# Patient Record
Sex: FEMALE | Race: WHITE | NOT HISPANIC OR LATINO | Employment: UNEMPLOYED | URBAN - METROPOLITAN AREA
[De-identification: names, ages, dates, MRNs, and addresses within clinical notes are randomized per-mention and may not be internally consistent; named-entity substitution may affect disease eponyms.]

---

## 2020-07-29 ENCOUNTER — HOSPITAL ENCOUNTER (EMERGENCY)
Facility: HOSPITAL | Age: 40
Discharge: HOME | End: 2020-07-29
Attending: EMERGENCY MEDICINE
Payer: COMMERCIAL

## 2020-07-29 ENCOUNTER — APPOINTMENT (EMERGENCY)
Dept: RADIOLOGY | Facility: HOSPITAL | Age: 40
End: 2020-07-29
Payer: COMMERCIAL

## 2020-07-29 VITALS
WEIGHT: 161.6 LBS | DIASTOLIC BLOOD PRESSURE: 55 MMHG | HEART RATE: 74 BPM | OXYGEN SATURATION: 95 % | SYSTOLIC BLOOD PRESSURE: 116 MMHG | TEMPERATURE: 98.4 F | RESPIRATION RATE: 18 BRPM

## 2020-07-29 DIAGNOSIS — M94.0 COSTOCHONDRITIS: Primary | ICD-10-CM

## 2020-07-29 PROCEDURE — 99284 EMERGENCY DEPT VISIT MOD MDM: CPT | Performed by: EMERGENCY MEDICINE

## 2020-07-29 PROCEDURE — 93005 ELECTROCARDIOGRAM TRACING: CPT

## 2020-07-29 PROCEDURE — 99285 EMERGENCY DEPT VISIT HI MDM: CPT

## 2020-07-29 PROCEDURE — 71046 X-RAY EXAM CHEST 2 VIEWS: CPT

## 2020-07-29 RX ORDER — IBUPROFEN 400 MG/1
400 TABLET ORAL EVERY 6 HOURS PRN
Qty: 28 TABLET | Refills: 0 | Status: SHIPPED | OUTPATIENT
Start: 2020-07-29 | End: 2022-05-23 | Stop reason: SDUPTHER

## 2020-07-29 RX ORDER — IBUPROFEN 400 MG/1
400 TABLET ORAL ONCE
Status: COMPLETED | OUTPATIENT
Start: 2020-07-29 | End: 2020-07-29

## 2020-07-29 RX ADMIN — IBUPROFEN 400 MG: 400 TABLET ORAL at 16:20

## 2020-07-29 NOTE — ED PROVIDER NOTES
History  Chief Complaint   Patient presents with    Chest Pain     pt states over past 4 days she started w/ central CP w/out radiation  states the pain is constant  describes that pain as shooting   Numbness     pt states she had left arm numbness that stared before her CP began  states the numbness is intermittent  states numbness only occured in left arm  46-year-old female presenting with chest pain and left arm numbness  Her chest pain started acutely 4 days ago, sharp, constant,"8/10," localized to the center of her chest, and sometimes radiates to her back  It is made worse with taking a deep breath, coughing, and putting pressure on the chest  She denies associated diaphoresis but says that she has been experiencing shortness of breath for the past several months  Walking for extended periods of time, or walking upstairs exacerbates the shortness of breath  She has also experienced productive cough that gradually worsened over the past several years  She is a chronic smoker, has been smoking 1 pack per day since the age of 16  Her other complaint is left arm numbness that started 2 days before the chest pain  Patient notes that she has chronic pain in her lower back, neck, and also carpal tunnel syndrome of bilateral wrists  The numbness occurred acutely, with no known immediate associated factors, but believes that 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 may have contributed to her symptoms  The numbness starts randomly, last approximately 30 seconds to a minute, and radiates down from her shoulder to her hands  She also noted that she has similar radiating numbness of her left leg that occurs intermittently but more frequently than the numbness in her left arm  She was also told that she has carpal tunnel syndrome in her left shoulder with nerve impingement  She also takes SSRI for depression  Denies any other medical problems, recent or past surgeries  She denies headaches, palpitations, fevers, changes in vision, hearing, abdominal pain, changes in bowel movements, urinary problems, muscle weakness  None       Past Medical History:   Diagnosis Date    Anxiety     Back pain        Past Surgical History:   Procedure Laterality Date     SECTION      GALLBLADDER SURGERY      HYSTERECTOMY         History reviewed  No pertinent family history  I have reviewed and agree with the history as documented  E-Cigarette/Vaping     E-Cigarette/Vaping Substances     Social History     Tobacco Use    Smoking status: Current Every Day Smoker     Packs/day: 1 00    Smokeless tobacco: Never Used   Substance Use Topics    Alcohol use: Not Currently    Drug use: Not Currently        Review of Systems   Constitutional: Negative for activity change, appetite change, chills, diaphoresis, fatigue, fever and unexpected weight change  HENT: Negative for ear pain, hearing loss and tinnitus  Physical Exam  ED Triage Vitals [20 1508]   Temperature Pulse Respirations Blood Pressure SpO2   98 4 °F (36 9 °C) 84 18 162/71 95 %      Temp Source Heart Rate Source Patient Position - Orthostatic VS BP Location FiO2 (%)   Oral Monitor Sitting Right arm --      Pain Score       8             Orthostatic Vital Signs  Vitals:    20 1508 20 1622   BP: 162/71 116/55   Pulse: 84 74   Patient Position - Orthostatic VS: Sitting Sitting       Physical Exam   Constitutional: She appears well-developed and well-nourished  Non-toxic appearance  She does not appear ill  HENT:   Head: Normocephalic and atraumatic  Eyes: Pupils are equal, round, and reactive to light  EOM are normal    Neck: Normal range of motion  Neck supple  Cardiovascular: Normal rate, regular rhythm, intact distal pulses and normal pulses  No murmur heard  Pulmonary/Chest: Effort normal  No respiratory distress  She has wheezes  Mild generalized expiratory wheezes bilaterally  ED Medications  Medications   ibuprofen (MOTRIN) tablet 400 mg (400 mg Oral Given 7/29/20 1620)       Diagnostic Studies  Results Reviewed     None                 XR chest 2 views   Final Result by Damien Watt MD (07/29 1654)      No acute cardiopulmonary disease  Workstation performed: [id number]               Procedures  Procedures      ED Course                       PERC Rule for PE      Most Recent Value   PERC Rule for PE   Age >=50  0 Filed at: 07/29/2020 1604   HR >=100  0 Filed at: 07/29/2020 1604   O2 Sat on room air < 95%  1 Filed at: 07/29/2020 1604   History of PE or DVT  0 Filed at: 07/29/2020 1604   Recent trauma or surgery  0 Filed at: 07/29/2020 1604   Hemoptysis  0 Filed at: 07/29/2020 1604   Exogenous estrogen  0 Filed at: 07/29/2020 1604   Unilateral leg swelling  0 Filed at: 07/29/2020 1604   PERC Rule for PE Results  1 Filed at: 07/29/2020 1604                Wells' Criteria for PE      Most Recent Value   Wells' Criteria for PE   Clinical signs and symptoms of DVT  0 Filed at: 07/29/2020 1605   PE is primary diagnosis or equally likely  0 Filed at: 07/29/2020 1605   HR >100  0 Filed at: 07/29/2020 1605   Immobilization at least 3 days or Surgery in the previous 4 weeks  0 Filed at: 07/29/2020 1605   Previous, objectively diagnosed PE or DVT  0 Filed at: 07/29/2020 1605   Hemoptysis  0 Filed at: 07/29/2020 1605   Malignancy with treatment within 6 months or palliative  0 Filed at: 07/29/2020 1605   Wells' Criteria Total  0 Filed at: 07/29/2020 1605                MDM  Number of Diagnoses or Management Options  Costochondritis: established and improving  Diagnosis management comments: 73-year-old female presenting with chest pain and left arm numbness  Chest pain is localized to the Center of the chest and is not associated with diaphoresis, acute shortness of breath  Has a history of mild chronic shortness of breath likely due to extensive smoking history    Physical examination reveals acute pain on palpation of the center of the chest, exacerbation of the pain with deep inhalation, or coughing  Chest x-ray revealed no acute abnormalities  EKG revealed no acute abnormalities  Etiology likely costochondritis and the patient will be discharged with 400 mg ibuprofen q 6 hours p r n  Chelo De Luna Patient advised to follow up with the PCP as soon as possible  Advised to come back to the emergency department should her symptoms worsen, including worsening of chest pain, change in the quality chest pain, diaphoresis, acute worsening of mild chronic shortness of breath, syncope, new concerning symptoms  Amount and/or Complexity of Data Reviewed  Tests in the radiology section of CPT®: ordered and reviewed  Discuss the patient with other providers: yes  Independent visualization of images, tracings, or specimens: yes          Disposition  Final diagnoses:   Costochondritis     Time reflects when diagnosis was documented in both MDM as applicable and the Disposition within this note     Time User Action Codes Description Comment    7/29/2020  5:06 PM Carlos Garber Add [M94 0] Costochondritis       ED Disposition     ED Disposition Condition Date/Time Comment    Discharge Stable Wed Jul 29, 2020  5:05 PM Tatiana Wilkins discharge to home/self care  Follow-up Information    None         Patient's Medications   Discharge Prescriptions    IBUPROFEN (MOTRIN) 400 MG TABLET    Take 1 tablet (400 mg total) by mouth every 6 (six) hours as needed for mild pain or moderate pain       Start Date: 7/29/2020 End Date: --       Order Dose: 400 mg       Quantity: 28 tablet    Refills: 0     No discharge procedures on file  PDMP Review     None           ED Provider  Attending physically available and evaluated Tatiana Wilkins I managed the patient along with the ED Attending      Electronically Signed by         Chloe Ghosh MD  07/29/20 9297

## 2020-07-30 LAB
ATRIAL RATE: 74 BPM
P AXIS: 60 DEGREES
PR INTERVAL: 138 MS
QRS AXIS: 72 DEGREES
QRSD INTERVAL: 74 MS
QT INTERVAL: 364 MS
QTC INTERVAL: 404 MS
T WAVE AXIS: 19 DEGREES
VENTRICULAR RATE: 74 BPM

## 2020-07-30 PROCEDURE — 93010 ELECTROCARDIOGRAM REPORT: CPT | Performed by: INTERNAL MEDICINE

## 2021-04-21 ENCOUNTER — OFFICE VISIT (OUTPATIENT)
Dept: FAMILY MEDICINE CLINIC | Facility: CLINIC | Age: 41
End: 2021-04-21
Payer: COMMERCIAL

## 2021-04-21 VITALS
RESPIRATION RATE: 14 BRPM | HEIGHT: 61 IN | BODY MASS INDEX: 33.23 KG/M2 | HEART RATE: 75 BPM | SYSTOLIC BLOOD PRESSURE: 92 MMHG | WEIGHT: 176 LBS | DIASTOLIC BLOOD PRESSURE: 58 MMHG | OXYGEN SATURATION: 97 % | TEMPERATURE: 96.6 F

## 2021-04-21 DIAGNOSIS — Z13.1 SCREENING FOR DIABETES MELLITUS: ICD-10-CM

## 2021-04-21 DIAGNOSIS — M25.461 KNEE EFFUSION, RIGHT: ICD-10-CM

## 2021-04-21 DIAGNOSIS — M25.561 CHRONIC PAIN OF RIGHT KNEE: ICD-10-CM

## 2021-04-21 DIAGNOSIS — G89.29 CHRONIC PAIN OF RIGHT KNEE: ICD-10-CM

## 2021-04-21 DIAGNOSIS — F31.0 BIPOLAR AFFECTIVE DISORDER, CURRENT EPISODE HYPOMANIC (HCC): Primary | ICD-10-CM

## 2021-04-21 DIAGNOSIS — F17.200 TOBACCO USE DISORDER: ICD-10-CM

## 2021-04-21 DIAGNOSIS — E66.09 CLASS 1 OBESITY DUE TO EXCESS CALORIES WITHOUT SERIOUS COMORBIDITY WITH BODY MASS INDEX (BMI) OF 33.0 TO 33.9 IN ADULT: ICD-10-CM

## 2021-04-21 DIAGNOSIS — E78.00 PURE HYPERCHOLESTEROLEMIA: ICD-10-CM

## 2021-04-21 PROBLEM — E78.5 HYPERLIPIDEMIA: Status: ACTIVE | Noted: 2021-04-21

## 2021-04-21 PROCEDURE — 4004F PT TOBACCO SCREEN RCVD TLK: CPT | Performed by: FAMILY MEDICINE

## 2021-04-21 PROCEDURE — 3725F SCREEN DEPRESSION PERFORMED: CPT | Performed by: FAMILY MEDICINE

## 2021-04-21 PROCEDURE — 3008F BODY MASS INDEX DOCD: CPT | Performed by: FAMILY MEDICINE

## 2021-04-21 PROCEDURE — 99203 OFFICE O/P NEW LOW 30 MIN: CPT | Performed by: FAMILY MEDICINE

## 2021-04-21 RX ORDER — ALBUTEROL SULFATE 90 UG/1
AEROSOL, METERED RESPIRATORY (INHALATION)
COMMUNITY
Start: 2021-02-08 | End: 2022-05-16 | Stop reason: SDUPTHER

## 2021-04-21 RX ORDER — QUETIAPINE FUMARATE 50 MG/1
50 TABLET, FILM COATED ORAL
Qty: 90 TABLET | Refills: 2 | Status: SHIPPED | OUTPATIENT
Start: 2021-04-21 | End: 2022-05-16 | Stop reason: SDUPTHER

## 2021-04-21 RX ORDER — QUETIAPINE FUMARATE 25 MG/1
25 TABLET, FILM COATED ORAL
Qty: 90 TABLET | Refills: 2 | Status: SHIPPED | OUTPATIENT
Start: 2021-04-21 | End: 2022-05-16 | Stop reason: SDUPTHER

## 2021-04-21 RX ORDER — GABAPENTIN 600 MG/1
600 TABLET ORAL 3 TIMES DAILY
Qty: 270 TABLET | Refills: 2 | Status: SHIPPED | OUTPATIENT
Start: 2021-04-21 | End: 2022-05-16 | Stop reason: SDUPTHER

## 2021-04-21 RX ORDER — QUETIAPINE FUMARATE 25 MG/1
TABLET, FILM COATED ORAL
COMMUNITY
Start: 2021-03-21 | End: 2021-04-21 | Stop reason: SDUPTHER

## 2021-04-21 RX ORDER — ATORVASTATIN CALCIUM 20 MG/1
20 TABLET, FILM COATED ORAL DAILY
COMMUNITY
End: 2022-05-16 | Stop reason: SDUPTHER

## 2021-04-21 RX ORDER — GABAPENTIN 600 MG/1
600 TABLET ORAL 3 TIMES DAILY
COMMUNITY
End: 2021-04-21 | Stop reason: SDUPTHER

## 2021-04-21 RX ORDER — QUETIAPINE FUMARATE 50 MG/1
50 TABLET, FILM COATED ORAL
COMMUNITY
Start: 2021-03-21 | End: 2021-04-21 | Stop reason: SDUPTHER

## 2021-04-21 NOTE — ASSESSMENT & PLAN NOTE
Patient with pain and swelling in right knee for over year, no acute injury though she has had several traumatic injuries in the last several years  Exam with mild effusion, significant crepitus, pain with knee flexion  She had an x-ray done at urgent care in March that showed a moderate suprapatellar effusion, a 6 mm intra-articular osteochondral body in the posterior medial knee joint  Constellation of symptoms and x-ray findings somewhat concerning for meniscal tear    Will order MRI for further evaluation, consider referral to Ortho pending results of MRI

## 2021-04-21 NOTE — ASSESSMENT & PLAN NOTE
Patient previously followed by psychiatrist and has not seen in many years and was previously being managed by her former primary care provider  She is currently on gabapentin 600 mg 3 times a day which she states is for her mood, as well as Seroquel 75 mg at night  She is very tangential during exam, endorses auditory hallucinations  Denies any significant depressive symptoms at this time  Will continue current regimen though attempt to request records from previous provider
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
## 2021-04-21 NOTE — ASSESSMENT & PLAN NOTE
She is currently on atorvastatin    Last lipid panel on file from 1 year ago in care everywhere was normal   Repeat lipid panel

## 2021-05-04 ENCOUNTER — IMMUNIZATIONS (OUTPATIENT)
Dept: FAMILY MEDICINE CLINIC | Facility: HOSPITAL | Age: 41
End: 2021-05-04

## 2021-05-04 DIAGNOSIS — Z23 ENCOUNTER FOR IMMUNIZATION: Primary | ICD-10-CM

## 2021-05-04 PROCEDURE — 91300 SARS-COV-2 / COVID-19 MRNA VACCINE (PFIZER-BIONTECH) 30 MCG: CPT

## 2021-05-04 PROCEDURE — 0001A SARS-COV-2 / COVID-19 MRNA VACCINE (PFIZER-BIONTECH) 30 MCG: CPT

## 2021-05-17 ENCOUNTER — TELEPHONE (OUTPATIENT)
Dept: FAMILY MEDICINE CLINIC | Facility: CLINIC | Age: 41
End: 2021-05-17

## 2021-05-17 NOTE — TELEPHONE ENCOUNTER
Auth completed for CPT code 16964, approved, Kerri Fuller # [id number] and valid  05/14/2021-07/13/2021  Patient notfiied of message

## 2021-05-18 ENCOUNTER — HOSPITAL ENCOUNTER (OUTPATIENT)
Dept: MRI IMAGING | Facility: HOSPITAL | Age: 41
Discharge: HOME | End: 2021-05-18
Payer: COMMERCIAL

## 2021-05-18 DIAGNOSIS — M25.561 CHRONIC PAIN OF RIGHT KNEE: ICD-10-CM

## 2021-05-18 DIAGNOSIS — G89.29 CHRONIC PAIN OF RIGHT KNEE: ICD-10-CM

## 2021-05-18 DIAGNOSIS — M25.461 KNEE EFFUSION, RIGHT: ICD-10-CM

## 2021-05-18 PROCEDURE — G1004 CDSM NDSC: HCPCS

## 2021-05-18 PROCEDURE — A9585 GADOBUTROL INJECTION: HCPCS | Performed by: FAMILY MEDICINE

## 2021-05-18 PROCEDURE — 73723 MRI JOINT LWR EXTR W/O&W/DYE: CPT

## 2021-05-18 RX ADMIN — GADOBUTROL 7 ML: 604.72 INJECTION INTRAVENOUS at 19:53

## 2021-05-19 ENCOUNTER — TELEPHONE (OUTPATIENT)
Dept: FAMILY MEDICINE CLINIC | Facility: CLINIC | Age: 41
End: 2021-05-19

## 2021-05-19 NOTE — TELEPHONE ENCOUNTER
Please call patient and let her know that her MRI shows wear and tear on the joint between the patella and femur as well as a large effusion (fluid in the knee)  There were no mensicus or ligament tears  She would likely get relief from aspiration of the fluid and a knee injection with a steroid   I can do that here in the office if she would like to schedule an appointment     Physical therapy and a referal to ortho can also be considered as welll

## 2021-05-24 PROBLEM — M22.41 PATELLOFEMORAL CHONDROSIS OF RIGHT KNEE: Status: ACTIVE | Noted: 2021-05-24

## 2021-07-22 DIAGNOSIS — F31.0 BIPOLAR AFFECTIVE DISORDER, CURRENT EPISODE HYPOMANIC (HCC): ICD-10-CM

## 2021-07-22 RX ORDER — QUETIAPINE FUMARATE 50 MG/1
50 TABLET, FILM COATED ORAL
Qty: 90 TABLET | Refills: 2 | Status: CANCELLED | OUTPATIENT
Start: 2021-07-22

## 2021-07-22 RX ORDER — GABAPENTIN 600 MG/1
600 TABLET ORAL 3 TIMES DAILY
Qty: 270 TABLET | Refills: 2 | Status: CANCELLED | OUTPATIENT
Start: 2021-07-22

## 2021-07-22 NOTE — TELEPHONE ENCOUNTER
Patient moved to [facility] & will be looking for another PCP closer to her new home

## 2022-05-16 ENCOUNTER — TELEPHONE (OUTPATIENT)
Dept: FAMILY MEDICINE CLINIC | Facility: CLINIC | Age: 42
End: 2022-05-16

## 2022-05-16 ENCOUNTER — OFFICE VISIT (OUTPATIENT)
Dept: FAMILY MEDICINE CLINIC | Facility: CLINIC | Age: 42
End: 2022-05-16
Payer: COMMERCIAL

## 2022-05-16 VITALS
WEIGHT: 180.6 LBS | HEIGHT: 61 IN | DIASTOLIC BLOOD PRESSURE: 78 MMHG | OXYGEN SATURATION: 96 % | BODY MASS INDEX: 34.1 KG/M2 | HEART RATE: 100 BPM | SYSTOLIC BLOOD PRESSURE: 112 MMHG | TEMPERATURE: 97.4 F

## 2022-05-16 DIAGNOSIS — F31.0 BIPOLAR AFFECTIVE DISORDER, CURRENT EPISODE HYPOMANIC (HCC): ICD-10-CM

## 2022-05-16 DIAGNOSIS — F17.200 TOBACCO USE DISORDER: ICD-10-CM

## 2022-05-16 DIAGNOSIS — G43.009 MIGRAINE WITHOUT AURA AND WITHOUT STATUS MIGRAINOSUS, NOT INTRACTABLE: ICD-10-CM

## 2022-05-16 DIAGNOSIS — F32.A ANXIETY AND DEPRESSION: ICD-10-CM

## 2022-05-16 DIAGNOSIS — R22.1 MASS OF RIGHT SIDE OF NECK: ICD-10-CM

## 2022-05-16 DIAGNOSIS — F41.9 ANXIETY AND DEPRESSION: ICD-10-CM

## 2022-05-16 DIAGNOSIS — E66.09 CLASS 1 OBESITY DUE TO EXCESS CALORIES WITHOUT SERIOUS COMORBIDITY WITH BODY MASS INDEX (BMI) OF 33.0 TO 33.9 IN ADULT: ICD-10-CM

## 2022-05-16 DIAGNOSIS — E78.00 PURE HYPERCHOLESTEROLEMIA: ICD-10-CM

## 2022-05-16 DIAGNOSIS — R06.02 SHORT OF BREATH ON EXERTION: ICD-10-CM

## 2022-05-16 DIAGNOSIS — H53.8 BLURRY VISION: ICD-10-CM

## 2022-05-16 DIAGNOSIS — Z12.31 ENCOUNTER FOR SCREENING MAMMOGRAM FOR BREAST CANCER: Primary | ICD-10-CM

## 2022-05-16 PROCEDURE — 99215 OFFICE O/P EST HI 40 MIN: CPT

## 2022-05-16 RX ORDER — ATORVASTATIN CALCIUM 20 MG/1
20 TABLET, FILM COATED ORAL DAILY
Qty: 90 TABLET | Refills: 0 | Status: SHIPPED | OUTPATIENT
Start: 2022-05-16 | End: 2022-05-23 | Stop reason: SDUPTHER

## 2022-05-16 RX ORDER — NICOTINE 21 MG/24HR
1 PATCH, TRANSDERMAL 24 HOURS TRANSDERMAL EVERY 24 HOURS
Qty: 28 PATCH | Refills: 0 | Status: SHIPPED | OUTPATIENT
Start: 2022-05-16 | End: 2022-06-23

## 2022-05-16 RX ORDER — GABAPENTIN 100 MG/1
100 CAPSULE ORAL 3 TIMES DAILY
COMMUNITY
End: 2022-05-16 | Stop reason: SDUPTHER

## 2022-05-16 RX ORDER — QUETIAPINE FUMARATE 25 MG/1
25 TABLET, FILM COATED ORAL
Qty: 90 TABLET | Refills: 2 | Status: SHIPPED | OUTPATIENT
Start: 2022-05-16

## 2022-05-16 RX ORDER — GABAPENTIN 100 MG/1
200 CAPSULE ORAL 3 TIMES DAILY
Qty: 60 CAPSULE | Refills: 0 | Status: SHIPPED | OUTPATIENT
Start: 2022-05-16 | End: 2022-05-16 | Stop reason: SDUPTHER

## 2022-05-16 RX ORDER — QUETIAPINE FUMARATE 50 MG/1
50 TABLET, FILM COATED ORAL
Qty: 90 TABLET | Refills: 2 | Status: SHIPPED | OUTPATIENT
Start: 2022-05-16

## 2022-05-16 RX ORDER — ALBUTEROL SULFATE 90 UG/1
2 AEROSOL, METERED RESPIRATORY (INHALATION) EVERY 6 HOURS PRN
Qty: 18 G | Refills: 0 | Status: SHIPPED | OUTPATIENT
Start: 2022-05-16 | End: 2022-06-13

## 2022-05-16 RX ORDER — PREDNISONE 10 MG/1
TABLET ORAL
Qty: 30 TABLET | Refills: 0 | Status: SHIPPED | OUTPATIENT
Start: 2022-05-16 | End: 2022-05-23 | Stop reason: ALTCHOICE

## 2022-05-16 RX ORDER — GABAPENTIN 600 MG/1
600 TABLET ORAL 3 TIMES DAILY
Qty: 270 TABLET | Refills: 2 | Status: SHIPPED | OUTPATIENT
Start: 2022-05-16 | End: 2022-05-16 | Stop reason: SDUPTHER

## 2022-05-16 NOTE — ASSESSMENT & PLAN NOTE
Suggest my fittness pal and reducing calories by 300 calories a day  Suggest small high protein/high fiber meals  Follow up 1 month

## 2022-05-16 NOTE — ASSESSMENT & PLAN NOTE
Nicoderm patches sent to pharmacy  Please try to quit  Have lab work, chest x ray and pfts performed, will call with results  Follow up here in 1 month

## 2022-05-16 NOTE — ASSESSMENT & PLAN NOTE
Start migraine diary: how often you get them, how long they last, if anything helps or makes them worse, if you have an aura, how much sleep you get the night before or if there is any precipitating factors  Follow up here in 1 month

## 2022-05-16 NOTE — PROGRESS NOTES
Assessment/Plan:         Problem List Items Addressed This Visit        Cardiovascular and Mediastinum    Migraine without aura and without status migrainosus, not intractable     Start migraine diary: how often you get them, how long they last, if anything helps or makes them worse, if you have an aura, how much sleep you get the night before or if there is any precipitating factors  Follow up here in 1 month  Relevant Medications    gabapentin (NEURONTIN) 100 mg capsule    gabapentin (NEURONTIN) 600 MG tablet       Other    Bipolar affective disorder, current episode hypomanic ([address] )     Currently depressed  Have lab work done, continue Seroquel  Follow up here in 1 month  Relevant Medications    nicotine (NICODERM CQ) 21 mg/24 hr TD 24 hr patch    gabapentin (NEURONTIN) 100 mg capsule    gabapentin (NEURONTIN) 600 MG tablet    QUEtiapine (SEROquel) 25 mg tablet    QUEtiapine (SEROquel) 50 mg tablet    Tobacco use disorder     Nicoderm patches sent to pharmacy  Please try to quit  Have lab work, chest x ray and pfts performed, will call with results  Follow up here in 1 month  Relevant Medications    nicotine (NICODERM CQ) 21 mg/24 hr TD 24 hr patch    Hyperlipidemia     Continue lipitor, have lab work done, will call with results  Relevant Medications    atorvastatin (LIPITOR) 20 mg tablet    Other Relevant Orders    CBC and differential    Comprehensive metabolic panel    Lipid panel    Class 1 obesity due to excess calories without serious comorbidity with body mass index (BMI) of 33 0 to 33 9 in adult     Suggest my fittness pal and reducing calories by 300 calories a day  Suggest small high protein/high fiber meals  Follow up 1 month  Anxiety and depression     Currently depressed, continue current medications  Have lab work done, follow up 1 month  Will consider adjunct medications             Relevant Medications    nicotine (NICODERM CQ) 21 mg/24 hr TD 24 hr patch    QUEtiapine (SEROquel) 25 mg tablet    QUEtiapine (SEROquel) 50 mg tablet    Mass of right side of neck     Have ultrasound performed, will call with results  Relevant Orders    US thyroid    Blurry vision    Relevant Orders    Ambulatory Referral to Ophthalmology    Short of breath on exertion    Relevant Medications    albuterol (PROVENTIL HFA,VENTOLIN HFA) 90 mcg/act inhaler    predniSONE 10 mg tablet    Other Relevant Orders    XR chest pa & lateral    Complete PFT with post bronchodilator      Other Visit Diagnoses     Encounter for screening mammogram for breast cancer    -  Primary    Relevant Orders    Mammo screening bilateral w 3d & cad            Subjective:      Patient ID: Oralia Ojeda is a 40 y o  female  Danica Amel is here today to establish care  She can hardly breath  She is using her inhaler 3 times a day  She is currently smoking 1 ppd  She also has a lump on her right lump  Her main concern today is that she might have COPD  The following portions of the patient's history were reviewed and updated as appropriate:   Past Medical History:  She has a past medical history of Anxiety, Back pain, and Bipolar disorder (manic depression) ([address] )  ,  _______________________________________________________________________  Medical Problems:  does not have any pertinent problems on file ,  _______________________________________________________________________  Past Surgical History:   has a past surgical history that includes Hysterectomy;  section; and Gallbladder surgery  ,  _______________________________________________________________________  Family History:  family history includes Alcohol abuse in her brother; Cancer in her maternal grandmother; Coronary artery disease in her brother; Diabetes in her father, mother, and sister;  Heart disease in her maternal grandfather, maternal grandmother, paternal grandfather, and paternal grandmother; Ovarian cancer in her maternal grandmother ,  _______________________________________________________________________  Social History:   reports that she has been smoking cigarettes  She has a 27 00 pack-year smoking history  She has never used smokeless tobacco  She reports previous alcohol use  She reports previous drug use ,  _______________________________________________________________________  Allergies:  has No Known Allergies     _______________________________________________________________________  Current Outpatient Medications   Medication Sig Dispense Refill    albuterol (PROVENTIL HFA,VENTOLIN HFA) 90 mcg/act inhaler Inhale 2 puffs every 6 (six) hours as needed for wheezing 18 g 0    atorvastatin (LIPITOR) 20 mg tablet Take 1 tablet (20 mg total) by mouth in the morning  90 tablet 0    gabapentin (NEURONTIN) 100 mg capsule Take 2 capsules (200 mg total) by mouth in the morning and 2 capsules (200 mg total) in the evening and 2 capsules (200 mg total) before bedtime  60 capsule 0    gabapentin (NEURONTIN) 600 MG tablet Take 1 tablet (600 mg total) by mouth in the morning and 1 tablet (600 mg total) in the evening and 1 tablet (600 mg total) before bedtime  270 tablet 2    ibuprofen (MOTRIN) 400 mg tablet Take 1 tablet (400 mg total) by mouth every 6 (six) hours as needed for mild pain or moderate pain 28 tablet 0    nicotine (NICODERM CQ) 21 mg/24 hr TD 24 hr patch Place 1 patch on the skin every 24 hours 28 patch 0    predniSONE 10 mg tablet Take 5 tablets (50 mg total) by mouth daily for 2 days, THEN 4 tablets (40 mg total) daily for 2 days, THEN 3 tablets (30 mg total) daily for 2 days, THEN 2 tablets (20 mg total) daily for 2 days, THEN 1 tablet (10 mg total) daily for 2 days   30 tablet 0    QUEtiapine (SEROquel) 25 mg tablet Take 1 tablet (25 mg total) by mouth daily at bedtime 90 tablet 2    QUEtiapine (SEROquel) 50 mg tablet Take 1 tablet (50 mg total) by mouth daily at bedtime 90 tablet 2    triamcinolone (KENALOG) 0 1 % ointment apply topically affected area twice a day       No current facility-administered medications for this visit      _______________________________________________________________________  Review of Systems   Constitutional: Positive for fatigue  Negative for chills, diaphoresis and fever  HENT: Negative for congestion, ear pain, sinus pressure, sinus pain and sore throat  Eyes: Negative for pain and visual disturbance  Respiratory: Positive for cough, chest tightness, shortness of breath and wheezing  Cardiovascular: Negative for chest pain and palpitations  Gastrointestinal: Negative for abdominal pain, constipation, diarrhea, nausea and vomiting  Genitourinary: Negative for dysuria, frequency, hematuria and urgency  Musculoskeletal: Positive for arthralgias, back pain and myalgias  Chronic   Skin: Negative for color change and rash  Neurological: Positive for headaches  Negative for dizziness, seizures and syncope  Psychiatric/Behavioral: Positive for agitation and dysphoric mood  All other systems reviewed and are negative  Objective:  Vitals:    05/16/22 1258   BP: 112/78   BP Location: Left arm   Patient Position: Sitting   Cuff Size: Standard   Pulse: 100   Temp: (!) 97 4 °F (36 3 °C)   TempSrc: Tympanic   SpO2: 96%   Weight: 81 9 kg (180 lb 9 6 oz)   Height: 5' 1" (1 549 m)     Body mass index is 34 12 kg/m²  Physical Exam  Vitals and nursing note reviewed  Constitutional:       Appearance: Normal appearance  HENT:      Right Ear: Tympanic membrane normal  There is impacted cerumen  Left Ear: Tympanic membrane normal  There is no impacted cerumen  Nose: Nose normal  No congestion  Mouth/Throat:      Mouth: Mucous membranes are moist       Pharynx: No posterior oropharyngeal erythema  Eyes:      Extraocular Movements: Extraocular movements intact  Cardiovascular:      Rate and Rhythm: Normal rate        Heart sounds: Normal heart sounds  No murmur heard  Pulmonary:      Effort: Pulmonary effort is normal       Breath sounds: Rales present  No wheezing  Abdominal:      Palpations: Abdomen is soft  Tenderness: There is no abdominal tenderness  Musculoskeletal:         General: Normal range of motion  Cervical back: Normal range of motion  Right lower leg: No edema  Left lower leg: No edema  Lymphadenopathy:      Cervical: No cervical adenopathy  Skin:     General: Skin is warm and dry  Neurological:      General: No focal deficit present  Mental Status: She is alert     Psychiatric:         Mood and Affect: Mood normal          Behavior: Behavior normal

## 2022-05-16 NOTE — TELEPHONE ENCOUNTER
Patient called stating she thought you were going to send in Gabapentin 800mg  I told her that you sent in the Gabapentin 600mg and 100mg  She states she said she does not want to have to take the two separate ones and she wants just the 800mg sent in  Are you able to send that in instead?

## 2022-05-16 NOTE — PATIENT INSTRUCTIONS
Problem List Items Addressed This Visit          Other    Bipolar affective disorder, current episode hypomanic ([facility])    Relevant Medications    nicotine (NICODERM CQ) 21 mg/24 hr TD 24 hr patch    gabapentin (NEURONTIN) 100 mg capsule    gabapentin (NEURONTIN) 600 MG tablet    QUEtiapine (SEROquel) 25 mg tablet    QUEtiapine (SEROquel) 50 mg tablet    Tobacco use disorder    Relevant Medications    nicotine (NICODERM CQ) 21 mg/24 hr TD 24 hr patch    Hyperlipidemia    Relevant Medications    atorvastatin (LIPITOR) 20 mg tablet    Other Relevant Orders    CBC and differential    Comprehensive metabolic panel    Lipid panel    Class 1 obesity due to excess calories without serious comorbidity with body mass index (BMI) of 33 0 to 33 9 in adult    Anxiety and depression    Relevant Medications    nicotine (NICODERM CQ) 21 mg/24 hr TD 24 hr patch    QUEtiapine (SEROquel) 25 mg tablet    QUEtiapine (SEROquel) 50 mg tablet    Mass of right side of neck    Relevant Orders    US thyroid          Other Visit Diagnoses       Encounter for screening mammogram for breast cancer    -  Primary    Relevant Orders    Mammo screening bilateral w 3d & cad    Blurry vision        Relevant Orders    Ambulatory Referral to Ophthalmology    Short of breath on exertion        Relevant Medications    albuterol (PROVENTIL HFA,VENTOLIN HFA) 90 mcg/act inhaler    Other Relevant Orders    XR chest pa & lateral    Complete PFT with post bronchodilator

## 2022-05-16 NOTE — ASSESSMENT & PLAN NOTE
Currently depressed, continue current medications  Have lab work done, follow up 1 month  Will consider adjunct medications

## 2022-05-17 ENCOUNTER — TELEPHONE (OUTPATIENT)
Dept: FAMILY MEDICINE CLINIC | Facility: CLINIC | Age: 42
End: 2022-05-17

## 2022-05-17 NOTE — TELEPHONE ENCOUNTER
Pt called inquiring about the gabapentin 800 it was only filled for 10 days pt said suppose be 30 days ?

## 2022-05-18 ENCOUNTER — HOSPITAL ENCOUNTER (OUTPATIENT)
Dept: RADIOLOGY | Facility: HOSPITAL | Age: 42
Discharge: HOME | End: 2022-05-18
Payer: COMMERCIAL

## 2022-05-18 ENCOUNTER — HOSPITAL ENCOUNTER (OUTPATIENT)
Dept: MAMMOGRAPHY | Facility: CLINIC | Age: 42
Discharge: HOME | End: 2022-05-18
Payer: COMMERCIAL

## 2022-05-18 VITALS — BODY MASS INDEX: 33.99 KG/M2 | WEIGHT: 180 LBS | HEIGHT: 61 IN

## 2022-05-18 DIAGNOSIS — Z12.31 ENCOUNTER FOR SCREENING MAMMOGRAM FOR BREAST CANCER: ICD-10-CM

## 2022-05-18 DIAGNOSIS — F31.0 BIPOLAR AFFECTIVE DISORDER, CURRENT EPISODE HYPOMANIC (HCC): ICD-10-CM

## 2022-05-18 DIAGNOSIS — R06.02 SHORT OF BREATH ON EXERTION: ICD-10-CM

## 2022-05-18 PROCEDURE — 77063 BREAST TOMOSYNTHESIS BI: CPT

## 2022-05-18 PROCEDURE — 71046 X-RAY EXAM CHEST 2 VIEWS: CPT

## 2022-05-18 PROCEDURE — 77067 SCR MAMMO BI INCL CAD: CPT

## 2022-05-18 RX ORDER — GABAPENTIN 800 MG/1
800 TABLET ORAL 3 TIMES DAILY
Qty: 90 TABLET | Refills: 0 | Status: SHIPPED | OUTPATIENT
Start: 2022-05-18 | End: 2022-05-23 | Stop reason: SDUPTHER

## 2022-05-19 ENCOUNTER — APPOINTMENT (OUTPATIENT)
Dept: LAB | Facility: HOSPITAL | Age: 42
End: 2022-05-19
Payer: COMMERCIAL

## 2022-05-19 ENCOUNTER — TELEPHONE (OUTPATIENT)
Dept: FAMILY MEDICINE CLINIC | Facility: CLINIC | Age: 42
End: 2022-05-19

## 2022-05-19 ENCOUNTER — HOSPITAL ENCOUNTER (OUTPATIENT)
Dept: ULTRASOUND IMAGING | Facility: CLINIC | Age: 42
Discharge: HOME | End: 2022-05-19
Payer: COMMERCIAL

## 2022-05-19 DIAGNOSIS — D72.820 LYMPHOCYTOSIS: ICD-10-CM

## 2022-05-19 DIAGNOSIS — E78.00 PURE HYPERCHOLESTEROLEMIA: ICD-10-CM

## 2022-05-19 DIAGNOSIS — F17.200 TOBACCO USE DISORDER: ICD-10-CM

## 2022-05-19 DIAGNOSIS — R06.02 SHORT OF BREATH ON EXERTION: ICD-10-CM

## 2022-05-19 DIAGNOSIS — R22.1 MASS OF RIGHT SIDE OF NECK: ICD-10-CM

## 2022-05-19 LAB
ALBUMIN SERPL BCP-MCNC: 3.2 G/DL (ref 3.5–5)
ALP SERPL-CCNC: 110 U/L (ref 46–116)
ALT SERPL W P-5'-P-CCNC: 16 U/L (ref 12–78)
ANION GAP SERPL CALCULATED.3IONS-SCNC: 9 MMOL/L (ref 4–13)
AST SERPL W P-5'-P-CCNC: 11 U/L (ref 5–45)
BASOPHILS # BLD AUTO: 0.02 THOUSANDS/ΜL (ref 0–0.1)
BASOPHILS NFR BLD AUTO: 0 % (ref 0–1)
BILIRUB SERPL-MCNC: 0.22 MG/DL (ref 0.2–1)
BUN SERPL-MCNC: 14 MG/DL (ref 5–25)
CALCIUM ALBUM COR SERPL-MCNC: 8.7 MG/DL (ref 8.3–10.1)
CALCIUM SERPL-MCNC: 8.1 MG/DL (ref 8.3–10.1)
CHLORIDE SERPL-SCNC: 107 MMOL/L (ref 100–108)
CHOLEST SERPL-MCNC: 199 MG/DL
CO2 SERPL-SCNC: 26 MMOL/L (ref 21–32)
CREAT SERPL-MCNC: 0.63 MG/DL (ref 0.6–1.3)
EOSINOPHIL # BLD AUTO: 0.16 THOUSAND/ΜL (ref 0–0.61)
EOSINOPHIL NFR BLD AUTO: 2 % (ref 0–6)
ERYTHROCYTE [DISTWIDTH] IN BLOOD BY AUTOMATED COUNT: 14.8 % (ref 11.6–15.1)
GFR SERPL CREATININE-BSD FRML MDRD: 111 ML/MIN/1.73SQ M
GLUCOSE P FAST SERPL-MCNC: 84 MG/DL (ref 65–99)
HCT VFR BLD AUTO: 41.2 % (ref 34.8–46.1)
HDLC SERPL-MCNC: 66 MG/DL
HGB BLD-MCNC: 13.3 G/DL (ref 11.5–15.4)
IMM GRANULOCYTES # BLD AUTO: 0.02 THOUSAND/UL (ref 0–0.2)
IMM GRANULOCYTES NFR BLD AUTO: 0 % (ref 0–2)
LDLC SERPL CALC-MCNC: 108 MG/DL (ref 0–100)
LYMPHOCYTES # BLD AUTO: 5.38 THOUSANDS/ΜL (ref 0.6–4.47)
LYMPHOCYTES NFR BLD AUTO: 51 % (ref 14–44)
MCH RBC QN AUTO: 29.6 PG (ref 26.8–34.3)
MCHC RBC AUTO-ENTMCNC: 32.3 G/DL (ref 31.4–37.4)
MCV RBC AUTO: 92 FL (ref 82–98)
MONOCYTES # BLD AUTO: 0.94 THOUSAND/ΜL (ref 0.17–1.22)
MONOCYTES NFR BLD AUTO: 9 % (ref 4–12)
NEUTROPHILS # BLD AUTO: 3.91 THOUSANDS/ΜL (ref 1.85–7.62)
NEUTS SEG NFR BLD AUTO: 38 % (ref 43–75)
NONHDLC SERPL-MCNC: 133 MG/DL
NRBC BLD AUTO-RTO: 0 /100 WBCS
PLATELET # BLD AUTO: 241 THOUSANDS/UL (ref 149–390)
PMV BLD AUTO: 11.2 FL (ref 8.9–12.7)
POTASSIUM SERPL-SCNC: 3.7 MMOL/L (ref 3.5–5.3)
PROT SERPL-MCNC: 6.6 G/DL (ref 6.4–8.2)
RBC # BLD AUTO: 4.5 MILLION/UL (ref 3.81–5.12)
SODIUM SERPL-SCNC: 142 MMOL/L (ref 136–145)
TRIGL SERPL-MCNC: 127 MG/DL
WBC # BLD AUTO: 10.43 THOUSAND/UL (ref 4.31–10.16)

## 2022-05-19 PROCEDURE — 80061 LIPID PANEL: CPT

## 2022-05-19 PROCEDURE — 76536 US EXAM OF HEAD AND NECK: CPT

## 2022-05-19 PROCEDURE — 36415 COLL VENOUS BLD VENIPUNCTURE: CPT

## 2022-05-19 PROCEDURE — 85025 COMPLETE CBC W/AUTO DIFF WBC: CPT

## 2022-05-19 PROCEDURE — 80053 COMPREHEN METABOLIC PANEL: CPT

## 2022-05-19 NOTE — TELEPHONE ENCOUNTER
----- Message from Lars Lujan, [place] sent at 5/19/2022 11:12 AM EDT -----  Hi,    Can you try to call this patient  I tried twice and can't get a hold of her  I made her US of her thyroid stat because I just want to make sure the mass isn't something more serious and I ordered her a stat CT for her breathing and some more blood work  Thanks   ROCK

## 2022-05-19 NOTE — TELEPHONE ENCOUNTER
Called pt back and was able to talk with her  Said she got her bloodwork done this morning  I let her know about the stat CT and US and she says she's getting that done next week I believe  Told her if she has any other questions to give us a call back

## 2022-05-20 ENCOUNTER — TELEPHONE (OUTPATIENT)
Dept: FAMILY MEDICINE CLINIC | Facility: CLINIC | Age: 42
End: 2022-05-20

## 2022-05-20 NOTE — TELEPHONE ENCOUNTER
----- Message from Eva Jones, [address] sent at 5/20/2022 12:40 PM EDT -----  Can you let her know that both the chest xray and US of her neck looked ok  I will call with the CT results after she has it done      Thanks,    Leslye Rea

## 2022-05-20 NOTE — TELEPHONE ENCOUNTER
Called pt back and let her know that her mammogram, chest xray and her US of her neck look okay  I let her know that once she has her CT done next Thursday that you'd call her with the results for that

## 2022-05-23 ENCOUNTER — OFFICE VISIT (OUTPATIENT)
Dept: FAMILY MEDICINE CLINIC | Facility: CLINIC | Age: 42
End: 2022-05-23
Payer: COMMERCIAL

## 2022-05-23 VITALS
BODY MASS INDEX: 34.36 KG/M2 | WEIGHT: 182 LBS | OXYGEN SATURATION: 97 % | SYSTOLIC BLOOD PRESSURE: 122 MMHG | HEART RATE: 86 BPM | DIASTOLIC BLOOD PRESSURE: 80 MMHG | HEIGHT: 61 IN

## 2022-05-23 DIAGNOSIS — F31.0 BIPOLAR AFFECTIVE DISORDER, CURRENT EPISODE HYPOMANIC (HCC): ICD-10-CM

## 2022-05-23 DIAGNOSIS — Z11.59 ENCOUNTER FOR HEPATITIS C SCREENING TEST FOR LOW RISK PATIENT: ICD-10-CM

## 2022-05-23 DIAGNOSIS — D72.820 LYMPHOCYTOSIS: ICD-10-CM

## 2022-05-23 DIAGNOSIS — R06.02 SHORT OF BREATH ON EXERTION: ICD-10-CM

## 2022-05-23 DIAGNOSIS — R55 SYNCOPE, UNSPECIFIED SYNCOPE TYPE: ICD-10-CM

## 2022-05-23 DIAGNOSIS — R22.1 MASS OF RIGHT SIDE OF NECK: Primary | ICD-10-CM

## 2022-05-23 DIAGNOSIS — R11.0 NAUSEA: ICD-10-CM

## 2022-05-23 DIAGNOSIS — H53.8 BLURRY VISION: ICD-10-CM

## 2022-05-23 DIAGNOSIS — F41.9 ANXIETY AND DEPRESSION: ICD-10-CM

## 2022-05-23 DIAGNOSIS — R53.83 FATIGUE, UNSPECIFIED TYPE: ICD-10-CM

## 2022-05-23 DIAGNOSIS — E78.00 PURE HYPERCHOLESTEROLEMIA: ICD-10-CM

## 2022-05-23 DIAGNOSIS — G43.109 MIGRAINE WITH AURA AND WITHOUT STATUS MIGRAINOSUS, NOT INTRACTABLE: ICD-10-CM

## 2022-05-23 DIAGNOSIS — H61.21 IMPACTED CERUMEN OF RIGHT EAR: ICD-10-CM

## 2022-05-23 DIAGNOSIS — E66.09 CLASS 1 OBESITY DUE TO EXCESS CALORIES WITHOUT SERIOUS COMORBIDITY WITH BODY MASS INDEX (BMI) OF 33.0 TO 33.9 IN ADULT: ICD-10-CM

## 2022-05-23 DIAGNOSIS — Z11.4 SCREENING FOR HIV (HUMAN IMMUNODEFICIENCY VIRUS): ICD-10-CM

## 2022-05-23 DIAGNOSIS — F17.200 TOBACCO USE DISORDER: ICD-10-CM

## 2022-05-23 DIAGNOSIS — F32.A ANXIETY AND DEPRESSION: ICD-10-CM

## 2022-05-23 DIAGNOSIS — M94.0 COSTOCHONDRITIS: ICD-10-CM

## 2022-05-23 DIAGNOSIS — G43.009 MIGRAINE WITHOUT AURA AND WITHOUT STATUS MIGRAINOSUS, NOT INTRACTABLE: ICD-10-CM

## 2022-05-23 DIAGNOSIS — T78.40XA ALLERGY, INITIAL ENCOUNTER: ICD-10-CM

## 2022-05-23 PROCEDURE — 69210 REMOVE IMPACTED EAR WAX UNI: CPT

## 2022-05-23 PROCEDURE — 99215 OFFICE O/P EST HI 40 MIN: CPT

## 2022-05-23 RX ORDER — LORATADINE 10 MG/1
10 TABLET ORAL DAILY
COMMUNITY
Start: 2022-05-07 | End: 2022-05-23 | Stop reason: SDUPTHER

## 2022-05-23 RX ORDER — BUPRENORPHINE AND NALOXONE 8; 2 MG/1; MG/1
FILM, SOLUBLE BUCCAL; SUBLINGUAL
COMMUNITY
Start: 2022-03-14 | End: 2022-05-23 | Stop reason: ALTCHOICE

## 2022-05-23 RX ORDER — BUPROPION HYDROCHLORIDE 150 MG/1
150 TABLET ORAL EVERY MORNING
Qty: 50 TABLET | Refills: 0 | Status: SHIPPED | OUTPATIENT
Start: 2022-05-23 | End: 2022-05-23

## 2022-05-23 RX ORDER — CLONIDINE HYDROCHLORIDE 0.1 MG/1
0.1 TABLET ORAL 2 TIMES DAILY
COMMUNITY
Start: 2022-03-03 | End: 2022-05-23 | Stop reason: SDUPTHER

## 2022-05-23 RX ORDER — ONDANSETRON 8 MG/1
TABLET, ORALLY DISINTEGRATING ORAL
COMMUNITY
Start: 2022-03-03 | End: 2022-05-23 | Stop reason: SDUPTHER

## 2022-05-23 RX ORDER — ATORVASTATIN CALCIUM 20 MG/1
20 TABLET, FILM COATED ORAL DAILY
Qty: 90 TABLET | Refills: 0 | Status: SHIPPED | OUTPATIENT
Start: 2022-05-23

## 2022-05-23 RX ORDER — PREDNISONE 20 MG/1
TABLET ORAL
COMMUNITY
Start: 2022-05-07 | End: 2022-05-23 | Stop reason: ALTCHOICE

## 2022-05-23 RX ORDER — HYDROXYZINE HYDROCHLORIDE 25 MG/1
25 TABLET, FILM COATED ORAL EVERY 6 HOURS PRN
Qty: 90 TABLET | Refills: 3 | Status: SHIPPED | OUTPATIENT
Start: 2022-05-23

## 2022-05-23 RX ORDER — LORATADINE 10 MG/1
10 TABLET ORAL DAILY
Qty: 30 TABLET | Refills: 0 | Status: SHIPPED | OUTPATIENT
Start: 2022-05-23

## 2022-05-23 RX ORDER — ONDANSETRON 4 MG/1
4 TABLET, ORALLY DISINTEGRATING ORAL EVERY 8 HOURS PRN
Qty: 30 TABLET | Refills: 3 | Status: SHIPPED | OUTPATIENT
Start: 2022-05-23

## 2022-05-23 RX ORDER — TOPIRAMATE 25 MG/1
25 TABLET ORAL
Qty: 50 TABLET | Refills: 0 | Status: SHIPPED | OUTPATIENT
Start: 2022-05-23 | End: 2022-06-23

## 2022-05-23 RX ORDER — DIAZEPAM 5 MG/1
TABLET ORAL
COMMUNITY
Start: 2022-03-03 | End: 2022-05-23 | Stop reason: ALTCHOICE

## 2022-05-23 RX ORDER — GABAPENTIN 800 MG/1
800 TABLET ORAL 3 TIMES DAILY
Qty: 90 TABLET | Refills: 3 | Status: SHIPPED | OUTPATIENT
Start: 2022-05-23 | End: 2022-06-12 | Stop reason: SDUPTHER

## 2022-05-23 RX ORDER — CLONIDINE HYDROCHLORIDE 0.1 MG/1
0.1 TABLET ORAL
Qty: 30 TABLET | Refills: 3 | Status: SHIPPED | OUTPATIENT
Start: 2022-05-23

## 2022-05-23 RX ORDER — GABAPENTIN 600 MG/1
TABLET ORAL
COMMUNITY
Start: 2022-05-16 | End: 2022-05-23 | Stop reason: ALTCHOICE

## 2022-05-23 RX ORDER — IBUPROFEN 800 MG/1
800 TABLET ORAL EVERY 6 HOURS PRN
Qty: 60 TABLET | Refills: 3 | Status: SHIPPED | OUTPATIENT
Start: 2022-05-23

## 2022-05-23 NOTE — ASSESSMENT & PLAN NOTE
Suggest my fitness Pal   Reducing calories by 300 calories a day  Increasing activity to 150 minutes a week  Given instructions for dietary changes necessary for weight loss

## 2022-05-23 NOTE — PATIENT INSTRUCTIONS
Problem List Items Addressed This Visit          Cardiovascular and Mediastinum    Migraine with aura and without status migrainosus, not intractable       Start Topamax  25 mg at bedtime  If after a week you are not having any side effects increase 50 mg once daily before  Relevant Medications    gabapentin (NEURONTIN) 800 mg tablet    ibuprofen (MOTRIN) 800 mg tablet    topiramate (Topamax) 25 mg tablet    RESOLVED: Migraine without aura and without status migrainosus, not intractable       Start Topamax 25 mg in the evening  Before bed  If after a week you are not having any side effects from the Topamax increased to 50 mg before bed  Relevant Medications    gabapentin (NEURONTIN) 800 mg tablet    ibuprofen (MOTRIN) 800 mg tablet    topiramate (Topamax) 25 mg tablet       Musculoskeletal and Integument    Costochondritis      Ibuprofen every 6 hours as needed  Take with food  Relevant Medications    ibuprofen (MOTRIN) 800 mg tablet       Other    Bipolar affective disorder, current episode hypomanic ([facility] [address] )       Currently depressed  Will add Wellbutrin  150 mg in the a m  Alexandrea Serum After 3 days if no side effects increased to 300 mg once daily in the morning  Relevant Medications    cloNIDine (CATAPRES) 0 1 mg tablet    gabapentin (NEURONTIN) 800 mg tablet    hydrOXYzine HCL (ATARAX) 25 mg tablet    Tobacco use disorder       Continue nicotine patches  I am hopeful the addition of Wellbutrin help you to quit smoking  Strongly advised smoking  Relevant Orders    Ambulatory Referral to Pulmonology    Hyperlipidemia      Continue Lipitor  Advised low cholesterol diet  Reduced red meat, full fat dairy products, and perkins  Increase fiber             Relevant Medications    atorvastatin (LIPITOR) 20 mg tablet    Other Relevant Orders    Comprehensive metabolic panel    Class 1 obesity due to excess calories without serious comorbidity with body mass index (BMI) of 33 0 to 33 9 in adult       Reduce portion sizes  Suggest small   lean protein high fiber meals every 3 hours  Try to limit junk food  Increased activity 20-30 minutes 5-7 days a week           Anxiety and depression      Continue clonidine gabapentin and Seroquel  Add Wellbutrin  Return in 1 month  Relevant Medications    cloNIDine (CATAPRES) 0 1 mg tablet    hydrOXYzine HCL (ATARAX) 25 mg tablet    Mass of right side of neck - Primary       Have CT scan neck and chest will call with results  Relevant Orders    CT neck and chest wo contrast    Blurry vision       Please schedule appointment with [facility] as soon  Relevant Medications    TobraDex ophthalmic ointment    Short of breath on exertion       CT scan, stress echocardiogram and Holter  Will call with results  Relevant Orders    Stress test only, exercise    Echo complete w/ contrast if indicated    Holter monitor    Ambulatory Referral to Pulmonology    Lymphocytosis       Have lab work done  Will call with results           Relevant Medications    ibuprofen (MOTRIN) 800 mg tablet    Fatigue      Have lab work, will call with results  Relevant Orders    T4, free    T3, free    Stress test only, exercise    Echo complete w/ contrast if indicated    Holter monitor    Allergies       Start   Atarax as needed for ear itching and symptoms allergies  Relevant Medications    loratadine (CLARITIN) 10 mg tablet    hydrOXYzine HCL (ATARAX) 25 mg tablet    Nausea      Continue Zofran as needed  Relevant Medications    ondansetron (ZOFRAN-ODT) 4 mg disintegrating tablet    Syncope       CT scan, echocardiogram, stress test, Holter  Will call with results  Relevant Orders    Stress test only, exercise    Echo complete w/ contrast if indicated    Holter monitor    BMI 34 0-34 9,adult       Suggest my fitness Pal   Reducing calories by 300 calories a day    Increasing activity to 150 minutes a week  Given instructions for dietary changes necessary for weight loss  Other Visit Diagnoses       Screening for HIV (human immunodeficiency virus)         patient accepts screening  Relevant Orders    HIV 1/2 Antigen/Antibody (4th Generation) w Reflex [facility]    Encounter for hepatitis C screening test for low risk patient          Patient accepts screening  Relevant Orders    Hepatitis C antibody            Obesity   AMBULATORY CARE:   Obesity  means your body mass index (BMI) is greater than 30  Your healthcare provider will use your height and weight to measure your BMI  The risks of obesity include  many health problems, including injuries or physical disability  Diabetes (high blood sugar level)    High blood pressure or high cholesterol    Heart disease    Stroke    Gallbladder or liver disease    Cancer of the colon, breast, prostate, liver, or kidney    Sleep apnea    Arthritis or gout    Screening  is done to check for health conditions before you have signs or symptoms  If you are 28to 79years old, your blood sugar level may be checked every 3 years for signs of prediabetes or diabetes  Your healthcare provider will check your blood pressure at each visit  High blood pressure can lead to a stroke or other problems  Your provider may check for signs of heart disease, cancer, or other health problems  Seek care immediately if:   You have a severe headache, confusion, or difficulty speaking  You have weakness on one side of your body  You have chest pain, sweating, or shortness of breath  Call your doctor if:   You have symptoms of gallbladder or liver disease, such as pain in your upper abdomen  You have knee or hip pain and discomfort while walking  You have symptoms of diabetes, such as intense hunger and thirst, and frequent urination  You have symptoms of sleep apnea, such as snoring or daytime sleepiness      You have questions or concerns about your condition or care  Treatment for obesity  focuses on helping you lose weight to improve your health  Even a small decrease in BMI can reduce the risk for many health problems  Your healthcare provider will help you set a weight-loss goal   Lifestyle changes  are the first step in treating obesity  These include making healthy food choices and getting regular physical activity  Your healthcare provider may suggest a weight-loss program that involves coaching, education, and therapy  Medicine  may help you lose weight when it is used with a healthy foods and physical activity  Surgery  can help you lose weight if you are very obese and have other health problems  There are several types of weight-loss surgery  Ask your healthcare provider for more information  Tips for safe weight loss:   Set small, realistic goals  An example of a small goal is to walk for 20 minutes 5 days a week  Anther goal is to lose 5% of your body weight  Tell friends, family members, and coworkers about your goals  and ask for their support  Ask a friend to lose weight with you, or join a weight-loss support group  Identify foods or triggers that may cause you to overeat , and find ways to avoid them  Remove tempting high-calorie foods from your home and workplace  Place a bowl of fresh fruit on your kitchen counter  If stress causes you to eat, then find other ways to cope with stress  A counselor or therapist may be able to help you  Keep a diary to track what you eat and drink  Also write down how many minutes of physical activity you do each day  Weigh yourself once a week and record it in your diary  Eating changes: You will need to eat 500 to 1,000 fewer calories each day than you currently eat to lose 1 to 2 pounds a week  The following changes will help you cut calories:  Eat smaller portions  Use small plates, no larger than 9 inches in diameter  Fill your plate half full of fruits and vegetables  Measure your food using measuring cups until you know what a serving size looks like  Eat 3 meals and 1 or 2 snacks each day  Plan your meals in advance  Bre Moran and eat at home most of the time  Eat slowly  Do not skip meals  Skipping meals can lead to overeating later in the day  This can make it harder for you to lose weight  Talk with a dietitian to help you make a meal plan and schedule that is right for you  Eat fruits and vegetables at every meal   They are low in calories and high in fiber, which makes you feel full  Do not add butter, margarine, or cream sauce to vegetables  Use herbs to season steamed vegetables  Eat less fat and fewer fried foods  Eat more baked or grilled chicken and fish  These protein sources are lower in calories and fat than red meat  Limit fast food  Dress your salads with olive oil and vinegar instead of bottled dressing  Limit the amount of sugar you eat  Do not drink sugary beverages  Limit alcohol  Activity changes:  Physical activity is good for your body in many ways  It helps you burn calories and build strong muscles  It decreases stress and depression, and improves your mood  It can also help you sleep better  Talk to your healthcare provider before you begin an exercise program   Exercise for at least 30 minutes 5 days a week  Start slowly  Set aside time each day for physical activity that you enjoy and that is convenient for you  It is best to do both weight training and an activity that increases your heart rate, such as walking, bicycling, or swimming  Find ways to be more active  Do yard work and housecleaning  Walk up the stairs instead of using elevators  Spend your leisure time going to events that require walking, such as outdoor festivals or fairs  This extra physical activity can help you lose weight and keep it off  Follow up with your doctor as directed: You may need to meet with a dietitian   Write down your questions so you remember to ask them during your visits  © Copyright [employer] 2022 Information is for End User's use only and may not be sold, redistributed or otherwise used for commercial purposes  All illustrations and images included in CareNotes® are the copyrighted property of [employer]  or [employer]  The above information is an  only  It is not intended as medical advice for individual conditions or treatments  Talk to your doctor, nurse or pharmacist before following any medical regimen to see if it is safe and effective for you  Heart Healthy Diet   AMBULATORY CARE:   A heart healthy diet  is an eating plan low in unhealthy fats and sodium (salt)  The plan is high in healthy fats and fiber  A heart healthy diet helps improve your cholesterol levels and lowers your risk for heart disease and stroke  A dietitian will teach you how to read and understand food labels  Heart healthy diet guidelines to follow:   Choose foods that contain healthy fats  Unsaturated fats  include monounsaturated and polyunsaturated fats  Unsaturated fat is found in foods such as soybean, canola, olive, corn, and safflower oils  It is also found in soft tub margarine that is made with liquid vegetable oil  Omega-3 fat  is found in certain fish, such as salmon, tuna, and trout, and in walnuts and flaxseed  Eat fish high in omega-3 fats at least 2 times a week  Get 20 to 30 grams of fiber each day  Fruits, vegetables, whole-grain foods, and legumes (cooked beans) are good sources of fiber  Limit or do not have unhealthy fats  Cholesterol  is found in animal foods, such as eggs and lobster, and in dairy products made from whole milk  Limit cholesterol to less than 200 mg each day  Saturated fat  is found in meats, such as perkins and hamburger  It is also found in chicken or turkey skin, whole milk, and butter   Limit saturated fat to less than 7% of your total daily calories  Trans fat  is found in packaged foods, such as potato chips and cookies  It is also in hard margarine, some fried foods, and shortening  Do not eat foods that contain trans fats  Limit sodium as directed  You may be told to limit sodium to 2,000 to 2,300 mg each day  Choose low-sodium or no-salt-added foods  Add little or no salt to food you prepare  Use herbs and spices in place of salt         Include the following in your heart healthy plan:  Ask your dietitian or healthcare provider how many servings to have from each of the following food groups:  Grains:      Whole-wheat breads, cereals, and pastas, and brown rice    Low-fat, low-sodium crackers and chips    Vegetables:      Broccoli, green beans, green peas, and spinach    Collards, kale, and lima beans    Carrots, sweet potatoes, tomatoes, and peppers    Canned vegetables with no salt added    Fruits:      Bananas, peaches, pears, and pineapple    Grapes, raisins, and dates    Oranges, tangerines, grapefruit, orange juice, and grapefruit juice    Apricots, mangoes, melons, and papaya    Raspberries and strawberries    Canned fruit with no added sugar    Low-fat dairy:      Nonfat (skim) milk, 1% milk, and low-fat almond, cashew, or soy milks fortified with calcium    Low-fat cheese, regular or frozen yogurt, and cottage cheese    Meats and proteins:      Lean cuts of beef and pork (loin, leg, round), skinless chicken and turkey    Legumes, soy products, egg whites, or nuts    Limit or do not include the following in your heart healthy plan:   Unhealthy fats and oils:      Whole or 2% milk, cream cheese, sour cream, or cheese    High-fat cuts of beef (T-bone steaks, ribs), chicken or turkey with skin, and organ meats such as liver    Butter, stick margarine, shortening, and cooking oils such as coconut or palm oil    Foods and liquids high in sodium:      Packaged foods, such as frozen dinners, cookies, macaroni and cheese, and cereals with more than 300 mg of sodium per serving    Vegetables with added sodium, such as instant potatoes, vegetables with added sauces, or regular canned vegetables    Cured or smoked meats, such as hot dogs, perkins, and sausage    High-sodium ketchup, barbecue sauce, salad dressing, pickles, olives, soy sauce, or miso    Foods and liquids high in sugar:      Candy, cake, cookies, pies, or doughnuts    Soft drinks (soda), sports drinks, or sweetened tea    Canned or dry mixes for cakes, soups, sauces, or gravies    Other healthy heart guidelines:   Do not smoke  Nicotine and other chemicals in cigarettes and cigars can cause lung and heart damage  Ask your healthcare provider for information if you currently smoke and need help to quit  E-cigarettes or smokeless tobacco still contain nicotine  Talk to your healthcare provider before you use these products  Limit or do not drink alcohol as directed  Alcohol can damage your heart and raise your blood pressure  Your healthcare provider may give you specific daily and weekly limits  The general recommended limit is 1 drink a day for women 21 or older and for men 72 or older  Do not have more than 3 drinks in a day or 7 in a week  The recommended limit is 2 drinks a day for men 24to 59years of age  Do not have more than 4 drinks in a day or 14 in a week  A drink of alcohol is 12 ounces of beer, 5 ounces of wine, or 1½ ounces of liquor  Exercise regularly  Exercise can help you maintain a healthy weight and improve your blood pressure and cholesterol levels  Regular exercise can also decrease your risk for heart problems  Ask your healthcare provider about the best exercise plan for you  Do not start an exercise program without asking your healthcare provider  Follow up with your doctor or cardiologist as directed:  Write down your questions so you remember to ask them during your visits    © Copyright [employer] 2022 Information is for End User's use only and may not be sold, redistributed or otherwise used for commercial purposes  All illustrations and images included in CareNotes® are the copyrighted property of [employer]  or [employer]  The above information is an  only  It is not intended as medical advice for individual conditions or treatments  Talk to your doctor, nurse or pharmacist before following any medical regimen to see if it is safe and effective for you

## 2022-05-23 NOTE — ASSESSMENT & PLAN NOTE
Reduce portion sizes  Suggest small   lean protein high fiber meals every 3 hours  Try to limit junk food    Increased activity 20-30 minutes 5-7 days a week

## 2022-05-23 NOTE — ASSESSMENT & PLAN NOTE
Continue Lipitor  Advised low cholesterol diet  Reduced red meat, full fat dairy products, and perkins  Increase fiber

## 2022-05-23 NOTE — ASSESSMENT & PLAN NOTE
Start Topamax  25 mg at bedtime  If after a week you are not having any side effects increase 50 mg once daily before

## 2022-05-23 NOTE — Clinical Note
Can you let her know that I put in referral for pulm and give her the number and ask her to schedule her PFTs, she has to call to schedule

## 2022-05-23 NOTE — ASSESSMENT & PLAN NOTE
Currently depressed  Will add Wellbutrin  150 mg in the a m  Juan Alberto Petersonin After 3 days if no side effects increased to 300 mg once daily in the morning

## 2022-05-23 NOTE — ASSESSMENT & PLAN NOTE
Start Topamax 25 mg in the evening  Before bed  If after a week you are not having any side effects from the Topamax increased to 50 mg before bed

## 2022-05-23 NOTE — ASSESSMENT & PLAN NOTE
Lump is approximately 1 5 cm long, because you have a significant history of smoking and elevated lymphocytes  Have CT scan neck and chest will call with results

## 2022-05-23 NOTE — PROGRESS NOTES
Assessment/Plan:         Problem List Items Addressed This Visit        Cardiovascular and Mediastinum    Migraine with aura and without status migrainosus, not intractable       Start Topamax  25 mg at bedtime  If after a week you are not having any side effects increase 50 mg once daily before  Relevant Medications    gabapentin (NEURONTIN) 800 mg tablet    ibuprofen (MOTRIN) 800 mg tablet    topiramate (Topamax) 25 mg tablet    RESOLVED: Migraine without aura and without status migrainosus, not intractable       Start Topamax 25 mg in the evening  Before bed  If after a week you are not having any side effects from the Topamax increased to 50 mg before bed  Relevant Medications    gabapentin (NEURONTIN) 800 mg tablet    ibuprofen (MOTRIN) 800 mg tablet    topiramate (Topamax) 25 mg tablet       Musculoskeletal and Integument    Costochondritis      Ibuprofen every 6 hours as needed  Take with food  Relevant Medications    ibuprofen (MOTRIN) 800 mg tablet       Other    Bipolar affective disorder, current episode hypomanic ([address] )       Currently depressed  Will add Wellbutrin  150 mg in the a m  Darletta Pac After 3 days if no side effects increased to 300 mg once daily in the morning  Relevant Medications    cloNIDine (CATAPRES) 0 1 mg tablet    gabapentin (NEURONTIN) 800 mg tablet    hydrOXYzine HCL (ATARAX) 25 mg tablet    Tobacco use disorder       Continue nicotine patches  I am hopeful the addition of Wellbutrin help you to quit smoking  Strongly advised smoking  Relevant Orders    Ambulatory Referral to Pulmonology    Hyperlipidemia      Continue Lipitor  Advised low cholesterol diet  Reduced red meat, full fat dairy products, and perkins  Increase fiber             Relevant Medications    atorvastatin (LIPITOR) 20 mg tablet    Other Relevant Orders    Comprehensive metabolic panel    Class 1 obesity due to excess calories without serious comorbidity with body mass index (BMI) of 33 0 to 33 9 in adult       Reduce portion sizes  Suggest small   lean protein high fiber meals every 3 hours  Try to limit junk food  Increased activity 20-30 minutes 5-7 days a week           Anxiety and depression      Continue clonidine gabapentin and Seroquel  Add Wellbutrin  Return in 1 month  Relevant Medications    cloNIDine (CATAPRES) 0 1 mg tablet    hydrOXYzine HCL (ATARAX) 25 mg tablet    Mass of right side of neck - Primary     Lump is approximately 1 5 cm long, because you have a significant history of smoking and elevated lymphocytes  Have CT scan neck and chest will call with results  Relevant Orders    CT soft tissue neck wo contrast (Completed)    Blurry vision       Please schedule appointment with [facility] as soon  Relevant Medications    TobraDex ophthalmic ointment    Short of breath on exertion       CT scan, stress echocardiogram and Holter  Will call with results  Relevant Orders    Stress test only, exercise    Echo complete w/ contrast if indicated    Holter monitor    Ambulatory Referral to Pulmonology    Lymphocytosis       Have lab work done  Will call with results           Relevant Medications    ibuprofen (MOTRIN) 800 mg tablet    Fatigue      Have lab work, will call with results  Relevant Orders    T4, free    T3, free    Stress test only, exercise    Echo complete w/ contrast if indicated    Holter monitor    Allergies       Start   Atarax as needed for ear itching and symptoms allergies  Relevant Medications    loratadine (CLARITIN) 10 mg tablet    hydrOXYzine HCL (ATARAX) 25 mg tablet    Nausea      Continue Zofran as needed  Relevant Medications    ondansetron (ZOFRAN-ODT) 4 mg disintegrating tablet    Syncope       CT scan, echocardiogram, stress test, Holter  Will call with results             Relevant Orders    Stress test only, exercise    Echo complete w/ contrast if indicated Holter monitor    BMI 34 0-34 9,adult       Suggest my fitness Pal   Reducing calories by 300 calories a day  Increasing activity to 150 minutes a week  Given instructions for dietary changes necessary for weight loss  Other Visit Diagnoses     Screening for HIV (human immunodeficiency virus)         patient accepts screening  Relevant Orders    HIV 1/2 Antigen/Antibody (4th Generation) w Reflex [facility]    Encounter for hepatitis C screening test for low risk patient          Patient accepts screening  Relevant Orders    Hepatitis C antibody    Impacted cerumen of right ear        Relevant Orders    Ear cerumen removal (Completed)            Subjective:      Patient ID: Dangelo Tomlinson is a 40 y o  female  HPI    The following portions of the patient's history were reviewed and updated as appropriate:   Past Medical History:  She has a past medical history of Anxiety, Back pain, and Bipolar disorder (manic depression) ([address] )  ,  _______________________________________________________________________  Medical Problems:  does not have any pertinent problems on file ,  _______________________________________________________________________  Past Surgical History:   has a past surgical history that includes Hysterectomy ();  section; and Gallbladder surgery  ,  _______________________________________________________________________  Family History:  family history includes Alcohol abuse in her brother; Cancer in her maternal grandmother; Coronary artery disease in her brother; Diabetes in her father, mother, and sister; Heart disease in her maternal grandfather, maternal grandmother, paternal grandfather, and paternal grandmother; Lung cancer in her maternal aunt; Ovarian cancer in her paternal grandmother ,  _______________________________________________________________________  Social History:   reports that she has been smoking cigarettes  She has a 27 00 pack-year smoking history   She has never used smokeless tobacco  She reports previous alcohol use  She reports previous drug use ,  _______________________________________________________________________  Allergies:  has No Known Allergies     _______________________________________________________________________  Current Outpatient Medications   Medication Sig Dispense Refill    atorvastatin (LIPITOR) 20 mg tablet Take 1 tablet (20 mg total) by mouth in the morning  90 tablet 0    cloNIDine (CATAPRES) 0 1 mg tablet Take 1 tablet (0 1 mg total) by mouth daily at bedtime 30 tablet 3    gabapentin (NEURONTIN) 800 mg tablet Take 1 tablet (800 mg total) by mouth in the morning and 1 tablet (800 mg total) in the evening and 1 tablet (800 mg total) before bedtime  90 tablet 3    hydrOXYzine HCL (ATARAX) 25 mg tablet Take 1 tablet (25 mg total) by mouth every 6 (six) hours as needed for itching 90 tablet 3    ibuprofen (MOTRIN) 800 mg tablet Take 1 tablet (800 mg total) by mouth every 6 (six) hours as needed for mild pain or moderate pain 60 tablet 3    loratadine (CLARITIN) 10 mg tablet Take 1 tablet (10 mg total) by mouth in the morning  30 tablet 0    ondansetron (ZOFRAN-ODT) 4 mg disintegrating tablet Take 1 tablet (4 mg total) by mouth every 8 (eight) hours as needed for nausea or vomiting 30 tablet 3    TobraDex ophthalmic ointment Administer into the left eye 3 (three) times a day 3 5 g 3    topiramate (Topamax) 25 mg tablet Take 1 tablet (25 mg total) by mouth daily at bedtime Take one daily for a week, if not side effects increase to twice daily   50 tablet 0    albuterol (PROVENTIL HFA,VENTOLIN HFA) 90 mcg/act inhaler Inhale 2 puffs every 6 (six) hours as needed for wheezing 18 g 0    buPROPion (WELLBUTRIN XL) 150 mg 24 hr tablet Take 1 tablet (150 mg total) by mouth every morning 30 tablet 0    nicotine (NICODERM CQ) 21 mg/24 hr TD 24 hr patch Place 1 patch on the skin every 24 hours 28 patch 0    QUEtiapine (SEROquel) 25 mg tablet Take 1 tablet (25 mg total) by mouth daily at bedtime 90 tablet 2    QUEtiapine (SEROquel) 50 mg tablet Take 1 tablet (50 mg total) by mouth daily at bedtime 90 tablet 2    triamcinolone (KENALOG) 0 1 % ointment apply topically affected area twice a day       No current facility-administered medications for this visit      _______________________________________________________________________  Review of Systems   Constitutional: Positive for fatigue  Negative for chills, diaphoresis and fever  HENT: Positive for ear pain and tinnitus  Negative for congestion, sinus pressure, sinus pain and sore throat  Eyes: Positive for visual disturbance (blurry vision)  Negative for pain  Respiratory: Positive for cough, shortness of breath and wheezing  Cardiovascular: Negative for chest pain and palpitations  Gastrointestinal: Positive for constipation and nausea  Negative for abdominal pain, diarrhea and vomiting  Bowel movement every other day     Genitourinary: Positive for urgency  Negative for dysuria, frequency and hematuria  Musculoskeletal: Negative for arthralgias, back pain and myalgias  Skin: Negative for color change and rash  Neurological: Positive for dizziness and headaches  Negative for seizures and syncope  Psychiatric/Behavioral: Positive for agitation, dysphoric mood and sleep disturbance  Negative for suicidal ideas  All other systems reviewed and are negative  Objective:  Vitals:    05/23/22 0833   BP: 122/80   Pulse: 86   SpO2: 97%   Weight: 82 6 kg (182 lb)   Height: 5' 1" (1 549 m)     Body mass index is 34 39 kg/m²  Physical Exam  Vitals and nursing note reviewed  Constitutional:       Appearance: Normal appearance  She is not ill-appearing  HENT:      Right Ear: External ear normal  There is impacted cerumen  Left Ear: Tympanic membrane, ear canal and external ear normal  There is no impacted cerumen  Nose: Nose normal  No congestion  Mouth/Throat:      Mouth: Mucous membranes are moist       Pharynx: No posterior oropharyngeal erythema  Eyes:      Extraocular Movements: Extraocular movements intact  Cardiovascular:      Rate and Rhythm: Normal rate  Heart sounds: Normal heart sounds  No murmur heard  Pulmonary:      Effort: Pulmonary effort is normal       Breath sounds: Wheezing, rhonchi and rales present  Chest:      Chest wall: No tenderness  Abdominal:      Palpations: Abdomen is soft  Tenderness: There is no abdominal tenderness  Musculoskeletal:         General: Tenderness (thoralumbar spine) present  Normal range of motion  Cervical back: Normal range of motion  Right lower leg: No edema  Left lower leg: No edema  Skin:     General: Skin is warm and dry  Neurological:      General: No focal deficit present  Mental Status: She is alert  Psychiatric:         Mood and Affect: Mood normal          Behavior: Behavior normal          BMI Counseling: Body mass index is 34 39 kg/m²  The BMI is above normal  Nutrition recommendations include reducing portion sizes, 3-5 servings of fruits/vegetables daily, increasing intake of lean protein and reducing intake of cholesterol  Ear cerumen removal    Date/Time: 5/23/2022 3:01 PM  Performed by: MARINA Brothers  Authorized by: MARINA Brothers   Universal Protocol:  Consent: Verbal consent obtained  Risks and benefits: risks, benefits and alternatives were discussed  Consent given by: patient  Time out: Immediately prior to procedure a "time out" was called to verify the correct patient, procedure, equipment, support staff and site/side marked as required    Timeout called at: 5/23/2022 3:01 PM   Patient understanding: patient states understanding of the procedure being performed  Patient consent: the patient's understanding of the procedure matches consent given  Required items: required blood products, implants, devices, and special equipment available  Patient identity confirmed: verbally with patient      Patient location:  Clinic  Procedure details:     Local anesthetic:  None    Location:  R ear    Procedure type: irrigation with instrumentation      Instrumentation: loop      Approach:  External  Post-procedure details:     Complication:  None    Hearing quality:  Improved    Patient tolerance of procedure:   Tolerated well, no immediate complications

## 2022-05-23 NOTE — ASSESSMENT & PLAN NOTE
Continue nicotine patches  I am hopeful the addition of Wellbutrin help you to quit smoking  Strongly advised smoking

## 2022-05-25 ENCOUNTER — TELEPHONE (OUTPATIENT)
Dept: FAMILY MEDICINE CLINIC | Facility: CLINIC | Age: 42
End: 2022-05-25

## 2022-05-25 NOTE — TELEPHONE ENCOUNTER
Called pt and talked with her about seeing a pulmonary disease doctor  Gave her the name, number and address to schedule an appointment  Also she already had the central scheduling number to schedule her PFTs  She also said that the wellbutrin medication you prescribed her is not covered by her insurance and they won't pay for it  She can't afford it herself out of pocket  Needs a doctor's approval from what they told her

## 2022-05-25 NOTE — TELEPHONE ENCOUNTER
----- Message from Juana Peck sent at 5/24/2022  9:41 AM EDT -----    ----- Message -----  From: MARINA Garza  Sent: 5/23/2022  10:52 AM EDT  To: , #    Can you let her know that I put in referral for pulm and give her the number and ask her to schedule her PFTs, she has to call to schedule

## 2022-05-26 ENCOUNTER — HOSPITAL ENCOUNTER (OUTPATIENT)
Dept: CT IMAGING | Facility: CLINIC | Age: 42
Discharge: HOME | End: 2022-05-26
Payer: COMMERCIAL

## 2022-05-26 ENCOUNTER — TELEPHONE (OUTPATIENT)
Dept: FAMILY MEDICINE CLINIC | Facility: CLINIC | Age: 42
End: 2022-05-26

## 2022-05-26 DIAGNOSIS — D72.820 LYMPHOCYTOSIS: ICD-10-CM

## 2022-05-26 DIAGNOSIS — R22.1 MASS OF RIGHT SIDE OF NECK: ICD-10-CM

## 2022-05-26 DIAGNOSIS — F17.200 TOBACCO USE DISORDER: ICD-10-CM

## 2022-05-26 DIAGNOSIS — R06.02 SHORT OF BREATH ON EXERTION: ICD-10-CM

## 2022-05-26 PROCEDURE — 71250 CT THORAX DX C-: CPT

## 2022-05-26 PROCEDURE — 70490 CT SOFT TISSUE NECK W/O DYE: CPT

## 2022-05-26 PROCEDURE — G1004 CDSM NDSC: HCPCS

## 2022-05-26 NOTE — TELEPHONE ENCOUNTER
Omer Meredith called in because insurance will not cover the medication wellbutrin   Only will cover 30 tablets ( 1 tablet daily )   Need to resend script so that insurance will cover it

## 2022-05-31 ENCOUNTER — APPOINTMENT (OUTPATIENT)
Dept: LAB | Facility: HOSPITAL | Age: 42
End: 2022-05-31
Payer: COMMERCIAL

## 2022-05-31 ENCOUNTER — TELEPHONE (OUTPATIENT)
Dept: FAMILY MEDICINE CLINIC | Facility: CLINIC | Age: 42
End: 2022-05-31

## 2022-05-31 DIAGNOSIS — R22.1 MASS OF RIGHT SIDE OF NECK: ICD-10-CM

## 2022-05-31 DIAGNOSIS — D72.820 LYMPHOCYTOSIS: ICD-10-CM

## 2022-05-31 DIAGNOSIS — Z11.59 ENCOUNTER FOR HEPATITIS C SCREENING TEST FOR LOW RISK PATIENT: ICD-10-CM

## 2022-05-31 DIAGNOSIS — Z11.4 SCREENING FOR HIV (HUMAN IMMUNODEFICIENCY VIRUS): ICD-10-CM

## 2022-05-31 DIAGNOSIS — R53.83 FATIGUE, UNSPECIFIED TYPE: ICD-10-CM

## 2022-05-31 DIAGNOSIS — R06.02 SHORT OF BREATH ON EXERTION: ICD-10-CM

## 2022-05-31 DIAGNOSIS — F17.200 TOBACCO USE DISORDER: ICD-10-CM

## 2022-05-31 DIAGNOSIS — E78.00 PURE HYPERCHOLESTEROLEMIA: ICD-10-CM

## 2022-05-31 LAB
ALBUMIN SERPL BCP-MCNC: 3.4 G/DL (ref 3.5–5)
ALP SERPL-CCNC: 94 U/L (ref 46–116)
ALT SERPL W P-5'-P-CCNC: 15 U/L (ref 12–78)
ANION GAP SERPL CALCULATED.3IONS-SCNC: 13 MMOL/L (ref 4–13)
AST SERPL W P-5'-P-CCNC: 10 U/L (ref 5–45)
BILIRUB SERPL-MCNC: 0.36 MG/DL (ref 0.2–1)
BUN SERPL-MCNC: 12 MG/DL (ref 5–25)
CALCIUM ALBUM COR SERPL-MCNC: 9.3 MG/DL (ref 8.3–10.1)
CALCIUM SERPL-MCNC: 8.8 MG/DL (ref 8.3–10.1)
CHLORIDE SERPL-SCNC: 107 MMOL/L (ref 100–108)
CO2 SERPL-SCNC: 21 MMOL/L (ref 21–32)
CREAT SERPL-MCNC: 0.82 MG/DL (ref 0.6–1.3)
ERYTHROCYTE [DISTWIDTH] IN BLOOD BY AUTOMATED COUNT: 14.7 % (ref 11.6–15.1)
GFR SERPL CREATININE-BSD FRML MDRD: 89 ML/MIN/1.73SQ M
GLUCOSE SERPL-MCNC: 102 MG/DL (ref 65–140)
HCT VFR BLD AUTO: 41.1 % (ref 34.8–46.1)
HCV AB SER QL: NORMAL
HGB BLD-MCNC: 13.8 G/DL (ref 11.5–15.4)
IMM EOSINOPHIL NFR BLD MANUAL: 1 % (ref 0–6)
LYMPHOCYTES NFR BLD: 59 % (ref 14–44)
MCH RBC QN AUTO: 29.9 PG (ref 26.8–34.3)
MCHC RBC AUTO-ENTMCNC: 33.6 G/DL (ref 31.4–37.4)
MCV RBC AUTO: 89 FL (ref 82–98)
MONOCYTES NFR BLD AUTO: 3 % (ref 4–12)
NEUTS SEG NFR BLD AUTO: 37 % (ref 45–77)
NRBC BLD AUTO-RTO: 0 /100 WBCS
PLATELET # BLD AUTO: 273 THOUSANDS/UL (ref 149–390)
PMV BLD AUTO: 10.2 FL (ref 8.9–12.7)
POTASSIUM SERPL-SCNC: 2.9 MMOL/L (ref 3.5–5.3)
PROT SERPL-MCNC: 7.1 G/DL (ref 6.4–8.2)
RBC # BLD AUTO: 4.62 MILLION/UL (ref 3.81–5.12)
SODIUM SERPL-SCNC: 141 MMOL/L (ref 136–145)
T3FREE SERPL-MCNC: 2.51 PG/ML (ref 2.3–4.2)
T4 FREE SERPL-MCNC: 0.76 NG/DL (ref 0.76–1.46)
TOTAL CELLS COUNTED SPEC: 100
WBC # BLD AUTO: 13.33 THOUSAND/UL (ref 4.31–10.16)

## 2022-05-31 PROCEDURE — 87389 HIV-1 AG W/HIV-1&-2 AB AG IA: CPT

## 2022-05-31 PROCEDURE — 84481 FREE ASSAY (FT-3): CPT

## 2022-05-31 PROCEDURE — 84439 ASSAY OF FREE THYROXINE: CPT

## 2022-05-31 PROCEDURE — 36415 COLL VENOUS BLD VENIPUNCTURE: CPT

## 2022-05-31 PROCEDURE — 86803 HEPATITIS C AB TEST: CPT

## 2022-05-31 PROCEDURE — 85007 BL SMEAR W/DIFF WBC COUNT: CPT

## 2022-05-31 PROCEDURE — 80053 COMPREHEN METABOLIC PANEL: CPT

## 2022-05-31 PROCEDURE — 85027 COMPLETE CBC AUTOMATED: CPT

## 2022-05-31 NOTE — TELEPHONE ENCOUNTER
Patient phone will be cut off at midnight tonight   Can you please take the time to go over her CT scan with her   Brauliogita Mandujano is also out of the office today

## 2022-05-31 NOTE — TELEPHONE ENCOUNTER
Called pt back and let her know that her CT came back normal  Is still wondering why she has so much "junk" in her chest

## 2022-05-31 NOTE — TELEPHONE ENCOUNTER
----- Message from Nataliya Tan, [address]  sent at 5/31/2022  3:55 PM EDT -----  Regarding: CT results  Hi    Patient called inquiring about her CT results   Do you mind calling her and letting her know that the CT came back as normal       Thank you

## 2022-06-01 ENCOUNTER — TELEPHONE (OUTPATIENT)
Dept: FAMILY MEDICINE CLINIC | Facility: CLINIC | Age: 42
End: 2022-06-01

## 2022-06-01 LAB — HIV 1+2 AB+HIV1 P24 AG SERPL QL IA: NORMAL

## 2022-06-01 NOTE — TELEPHONE ENCOUNTER
----- Message from Lola Patino, [address] sent at 6/1/2022  9:58 AM EDT -----  Good morning, I just tried to call patient and her voice mail is not set up  Can you please try to reach her, I would like her to go to the ER to have her potassium replaced it is very low  Also, I think she may have something infectious going on in her lungs  Thank you

## 2022-06-01 NOTE — TELEPHONE ENCOUNTER
Pt called us back and was able to tell her that her potassium was low and she had to go to the ER to get it replaced and that she might have an infection/bacteria in her lungs even though her CT came back normal  I suggested to her to tell the ER about what's going on with her lungs and hopefully they'll be able to give her something to help

## 2022-06-01 NOTE — TELEPHONE ENCOUNTER
I just tried calling Vik Hummel myself and her phone just kept on ringing and at the end it said that her voicemail box hasn't been set up so I was unable to leave a voicemail either

## 2022-06-07 ENCOUNTER — TELEPHONE (OUTPATIENT)
Dept: FAMILY MEDICINE CLINIC | Facility: CLINIC | Age: 42
End: 2022-06-07

## 2022-06-07 NOTE — TELEPHONE ENCOUNTER
Tried calling pt about why she didn't do bloodwork that was due on June 1st last week and to talk about her potassium medication but her voicemail box was not set up with her phone so was unable to leave a voicemail  Will try again later today before I leave  Has no MyChart to send message

## 2022-06-07 NOTE — TELEPHONE ENCOUNTER
----- Message from Genaro Villavicencio, [address] sent at 6/7/2022 11:30 AM EDT -----  Can you please call her and ask her why she did not have her lab work done? She should not be on high dose potassium for an extended period  We need to make sure that her potassium level is normal  She should cut the potassium back to 10 mEq a day, but needs to have lab work done  Thank you

## 2022-06-07 NOTE — TELEPHONE ENCOUNTER
Attempted to call pt back again for the second time to let her know about her blood work and her potassium medication but her phone only rang again and her voicemail box has not been set up for anyone to leave a voicemail

## 2022-06-08 ENCOUNTER — TELEPHONE (OUTPATIENT)
Dept: FAMILY MEDICINE CLINIC | Facility: CLINIC | Age: 42
End: 2022-06-08

## 2022-06-09 ENCOUNTER — TELEPHONE (OUTPATIENT)
Dept: PULMONOLOGY | Facility: CLINIC | Age: 42
End: 2022-06-09

## 2022-06-09 NOTE — TELEPHONE ENCOUNTER
Attempted to reach pt to schedule appt from referral but pt only number of contact on file is not a working number

## 2022-06-12 ENCOUNTER — NURSE TRIAGE (OUTPATIENT)
Dept: OTHER | Facility: OTHER | Age: 42
End: 2022-06-12

## 2022-06-12 DIAGNOSIS — R06.02 SHORT OF BREATH ON EXERTION: ICD-10-CM

## 2022-06-12 DIAGNOSIS — F31.0 BIPOLAR AFFECTIVE DISORDER, CURRENT EPISODE HYPOMANIC (HCC): ICD-10-CM

## 2022-06-12 RX ORDER — GABAPENTIN 800 MG/1
800 TABLET ORAL 3 TIMES DAILY
Qty: 90 TABLET | Refills: 0 | Status: SHIPPED | OUTPATIENT
Start: 2022-06-12 | End: 2022-06-13 | Stop reason: SDUPTHER

## 2022-06-12 NOTE — TELEPHONE ENCOUNTER
Medication Refill Request     Name Gabapentin   Dose/Frequency 800 mg 3 times daily   Quantity 90  Verified pharmacy   [x]  Verified ordering Provider   [x]  Verified enough for 3 days  [] - Pt states she lost this medication and cannot find them

## 2022-06-12 NOTE — TELEPHONE ENCOUNTER
Regarding: Lost rx/out of rx   ----- Message from [facility] sent at 6/12/2022  4:50 PM EDT -----  "I take 800 mg gabapentin and I do [employer], I thought I had them in the car and I cant find them, I tried getting a refill at my pharmacy and they said its inactive and I dont have this medication to take now "

## 2022-06-12 NOTE — TELEPHONE ENCOUNTER
Reason for Disposition   Patient has refills remaining on their prescription    Answer Assessment - Initial Assessment Questions  1  NAME of MEDICATION: "What medicine are you calling about?"      Gabapentin    2  QUESTION: "What is your question?" (e g , medication refill, side effect)  Lost prescription     3  PRESCRIBING HCP: "Who prescribed it?" Reason: if prescribed by specialist, call should be referred to that group  Reddy SAVAGE    4  SYMPTOMS: "Do you have any symptoms?"  Denies    5   SEVERITY: If symptoms are present, ask "Are they mild, moderate or severe?"  N/a    Protocols used: MEDICATION QUESTION CALL-ADULT-

## 2022-06-13 ENCOUNTER — TELEPHONE (OUTPATIENT)
Dept: FAMILY MEDICINE CLINIC | Facility: CLINIC | Age: 42
End: 2022-06-13

## 2022-06-13 ENCOUNTER — NURSE TRIAGE (OUTPATIENT)
Dept: OTHER | Facility: OTHER | Age: 42
End: 2022-06-13

## 2022-06-13 DIAGNOSIS — F41.9 ANXIETY AND DEPRESSION: ICD-10-CM

## 2022-06-13 DIAGNOSIS — F32.A ANXIETY AND DEPRESSION: ICD-10-CM

## 2022-06-13 RX ORDER — BUPROPION HYDROCHLORIDE 150 MG/1
TABLET ORAL
Qty: 30 TABLET | Refills: 0 | Status: SHIPPED | OUTPATIENT
Start: 2022-06-13 | End: 2022-06-13 | Stop reason: SDUPTHER

## 2022-06-13 RX ORDER — ALBUTEROL SULFATE 90 UG/1
AEROSOL, METERED RESPIRATORY (INHALATION)
Qty: 18 G | Refills: 5 | Status: SHIPPED | OUTPATIENT
Start: 2022-06-13

## 2022-06-13 NOTE — TELEPHONE ENCOUNTER
Regarding: Urgent Medication Refill (gabapentin)   ----- Message from Shelbi Mariee sent at 6/13/2022  6:01 PM EDT -----  "[facility] claims that my gabapentin (NEURONTIN) 800 mg tablet that the doctor called in is still not there, they are requesting a verbal over the phone   Can you please do this as this is my 2nd day without my medications "

## 2022-06-13 NOTE — TELEPHONE ENCOUNTER
Patient keeps calling in about medication   Gabapentin   Stated that [facility] needs a verbal consist to approve medication   Took the time to call [facility] they are stating that, it is to soon to fill the medication   It will go through Saturday, 06/18/2022    Isidrasinai Andrea is saying she is without medication because she lost her medication   Are you able to send in a small amount for her until the one sent in on 06/12/2022 (yesterday) is able to be sent on 06/18/2022

## 2022-06-13 NOTE — TELEPHONE ENCOUNTER
Reason for Disposition   [1] Caller has URGENT medicine question about med that PCP or specialist prescribed AND [2] triager unable to answer question    Answer Assessment - Initial Assessment Questions  1  NAME of MEDICATION: "What medicine are you calling about?"      Gabapentin 800 mg PO tid    2  QUESTION: "What is your question?" (e g , medication refill, side effect)     Pharmacy needs a verbal order to fill medication early, earliest fill date is 6/18/22    3  PRESCRIBING HCP: "Who prescribed it?" Reason: if prescribed by specialist, call should be referred to that group  Lydia Stewart     4  SYMPTOMS: "Do you have any symptoms?"      Pain    5  SEVERITY: If symptoms are present, ask "Are they mild, moderate or severe?"      Mild to moderate    6   PREGNANCY:  "Is there any chance that you are pregnant?" "When was your last menstrual period?"      Denies    Protocols used: MEDICATION QUESTION CALL-ADULT-

## 2022-06-13 NOTE — TELEPHONE ENCOUNTER
Patient called stating the Pharmacy said the nurse needs to call in order for her gabapentin (NEURONTIN) 800 mg tablet to be filled  Relayed message to Jj Man

## 2022-06-14 ENCOUNTER — TELEPHONE (OUTPATIENT)
Dept: FAMILY MEDICINE CLINIC | Facility: CLINIC | Age: 42
End: 2022-06-14

## 2022-06-14 ENCOUNTER — APPOINTMENT (OUTPATIENT)
Dept: LAB | Facility: HOSPITAL | Age: 42
End: 2022-06-14
Payer: COMMERCIAL

## 2022-06-14 DIAGNOSIS — R53.83 FATIGUE, UNSPECIFIED TYPE: ICD-10-CM

## 2022-06-14 DIAGNOSIS — E87.6 HYPOKALEMIA: ICD-10-CM

## 2022-06-14 DIAGNOSIS — E78.00 PURE HYPERCHOLESTEROLEMIA: ICD-10-CM

## 2022-06-14 LAB
ALBUMIN SERPL BCP-MCNC: 3.3 G/DL (ref 3.5–5)
ALP SERPL-CCNC: 88 U/L (ref 46–116)
ALT SERPL W P-5'-P-CCNC: 18 U/L (ref 12–78)
ANION GAP SERPL CALCULATED.3IONS-SCNC: 9 MMOL/L (ref 4–13)
AST SERPL W P-5'-P-CCNC: 16 U/L (ref 5–45)
BASOPHILS # BLD AUTO: 0.01 THOUSANDS/ΜL (ref 0–0.1)
BASOPHILS NFR BLD AUTO: 0 % (ref 0–1)
BILIRUB SERPL-MCNC: 0.21 MG/DL (ref 0.2–1)
BUN SERPL-MCNC: 8 MG/DL (ref 5–25)
CALCIUM ALBUM COR SERPL-MCNC: 9.3 MG/DL (ref 8.3–10.1)
CALCIUM SERPL-MCNC: 8.7 MG/DL (ref 8.3–10.1)
CHLORIDE SERPL-SCNC: 108 MMOL/L (ref 100–108)
CO2 SERPL-SCNC: 26 MMOL/L (ref 21–32)
CREAT SERPL-MCNC: 0.72 MG/DL (ref 0.6–1.3)
EOSINOPHIL # BLD AUTO: 0.11 THOUSAND/ΜL (ref 0–0.61)
EOSINOPHIL NFR BLD AUTO: 1 % (ref 0–6)
ERYTHROCYTE [DISTWIDTH] IN BLOOD BY AUTOMATED COUNT: 13.2 % (ref 11.6–15.1)
GFR SERPL CREATININE-BSD FRML MDRD: 104 ML/MIN/1.73SQ M
GLUCOSE P FAST SERPL-MCNC: 71 MG/DL (ref 65–99)
HCT VFR BLD AUTO: 43.6 % (ref 34.8–46.1)
HGB BLD-MCNC: 14.7 G/DL (ref 11.5–15.4)
IMM GRANULOCYTES # BLD AUTO: 0.02 THOUSAND/UL (ref 0–0.2)
IMM GRANULOCYTES NFR BLD AUTO: 0 % (ref 0–2)
LYMPHOCYTES # BLD AUTO: 3.33 THOUSANDS/ΜL (ref 0.6–4.47)
LYMPHOCYTES NFR BLD AUTO: 33 % (ref 14–44)
MCH RBC QN AUTO: 29.6 PG (ref 26.8–34.3)
MCHC RBC AUTO-ENTMCNC: 33.7 G/DL (ref 31.4–37.4)
MCV RBC AUTO: 88 FL (ref 82–98)
MONOCYTES # BLD AUTO: 0.68 THOUSAND/ΜL (ref 0.17–1.22)
MONOCYTES NFR BLD AUTO: 7 % (ref 4–12)
NEUTROPHILS # BLD AUTO: 6.11 THOUSANDS/ΜL (ref 1.85–7.62)
NEUTS SEG NFR BLD AUTO: 59 % (ref 43–75)
NRBC BLD AUTO-RTO: 0 /100 WBCS
PLATELET # BLD AUTO: 246 THOUSANDS/UL (ref 149–390)
PMV BLD AUTO: 10.1 FL (ref 8.9–12.7)
POTASSIUM SERPL-SCNC: 3.8 MMOL/L (ref 3.5–5.3)
PROT SERPL-MCNC: 7 G/DL (ref 6.4–8.2)
RBC # BLD AUTO: 4.97 MILLION/UL (ref 3.81–5.12)
SODIUM SERPL-SCNC: 143 MMOL/L (ref 136–145)
WBC # BLD AUTO: 10.26 THOUSAND/UL (ref 4.31–10.16)

## 2022-06-14 PROCEDURE — 36415 COLL VENOUS BLD VENIPUNCTURE: CPT

## 2022-06-14 PROCEDURE — 80053 COMPREHEN METABOLIC PANEL: CPT

## 2022-06-14 PROCEDURE — 85025 COMPLETE CBC W/AUTO DIFF WBC: CPT

## 2022-06-14 NOTE — TELEPHONE ENCOUNTER
Tried calling pt to let her know about her scheduled appointment with pulmonology on July 27th in [facility] with Dr Sammie Jack at 9:40am and to have her PFT's done, but her phone only rings and then says there's no voicemail box set up and to call again later  So, I'll try to call her again after lunch today

## 2022-06-14 NOTE — TELEPHONE ENCOUNTER
Called pt back and was able to get a hold of her  Told her about her pulmonary appointment next month in [place] July 27th at 9:40am and that I would call central scheduling about her PFT's   I called them and they said that she was already scheduled for them on June 30th at 8:30 am

## 2022-06-14 NOTE — TELEPHONE ENCOUNTER
Called patient to let her know that her medication was called in last night by Coby Duenas at 5:30pm

## 2022-06-23 ENCOUNTER — OFFICE VISIT (OUTPATIENT)
Dept: FAMILY MEDICINE CLINIC | Facility: CLINIC | Age: 42
End: 2022-06-23
Payer: COMMERCIAL

## 2022-06-23 VITALS
HEART RATE: 76 BPM | SYSTOLIC BLOOD PRESSURE: 122 MMHG | HEIGHT: 61 IN | WEIGHT: 175 LBS | BODY MASS INDEX: 33.04 KG/M2 | DIASTOLIC BLOOD PRESSURE: 78 MMHG | OXYGEN SATURATION: 98 %

## 2022-06-23 DIAGNOSIS — F17.200 TOBACCO USE DISORDER: ICD-10-CM

## 2022-06-23 DIAGNOSIS — Z02.89 ENCOUNTER FOR PHYSICAL EXAMINATION RELATED TO EMPLOYMENT: ICD-10-CM

## 2022-06-23 DIAGNOSIS — M25.562 ARTHRALGIA OF BOTH KNEES: ICD-10-CM

## 2022-06-23 DIAGNOSIS — R06.02 SHORT OF BREATH ON EXERTION: ICD-10-CM

## 2022-06-23 DIAGNOSIS — R22.1 MASS OF RIGHT SIDE OF NECK: ICD-10-CM

## 2022-06-23 DIAGNOSIS — E78.00 PURE HYPERCHOLESTEROLEMIA: ICD-10-CM

## 2022-06-23 DIAGNOSIS — M25.469 KNEE SWELLING: ICD-10-CM

## 2022-06-23 DIAGNOSIS — F31.0 BIPOLAR AFFECTIVE DISORDER, CURRENT EPISODE HYPOMANIC (HCC): ICD-10-CM

## 2022-06-23 DIAGNOSIS — M25.561 ARTHRALGIA OF BOTH KNEES: ICD-10-CM

## 2022-06-23 DIAGNOSIS — N39.3 STRESS INCONTINENCE OF URINE: Primary | ICD-10-CM

## 2022-06-23 DIAGNOSIS — R07.9 CHEST PAIN, UNSPECIFIED TYPE: ICD-10-CM

## 2022-06-23 DIAGNOSIS — D72.820 LYMPHOCYTOSIS: ICD-10-CM

## 2022-06-23 DIAGNOSIS — R53.83 FATIGUE, UNSPECIFIED TYPE: ICD-10-CM

## 2022-06-23 DIAGNOSIS — R20.2 POSITIVE TINEL'S SIGN: ICD-10-CM

## 2022-06-23 DIAGNOSIS — G47.00 INSOMNIA, UNSPECIFIED TYPE: ICD-10-CM

## 2022-06-23 DIAGNOSIS — E66.09 CLASS 1 OBESITY DUE TO EXCESS CALORIES WITHOUT SERIOUS COMORBIDITY WITH BODY MASS INDEX (BMI) OF 33.0 TO 33.9 IN ADULT: ICD-10-CM

## 2022-06-23 DIAGNOSIS — Z23 NEED FOR PNEUMOCOCCAL VACCINATION: ICD-10-CM

## 2022-06-23 PROBLEM — T78.40XA ALLERGIES: Status: RESOLVED | Noted: 2022-05-23 | Resolved: 2022-06-23

## 2022-06-23 PROBLEM — H53.8 BLURRY VISION: Status: RESOLVED | Noted: 2022-05-16 | Resolved: 2022-06-23

## 2022-06-23 PROBLEM — F41.9 ANXIETY AND DEPRESSION: Status: RESOLVED | Noted: 2022-05-16 | Resolved: 2022-06-23

## 2022-06-23 PROBLEM — G89.29 CHRONIC PAIN OF RIGHT KNEE: Status: RESOLVED | Noted: 2021-04-21 | Resolved: 2022-06-23

## 2022-06-23 PROBLEM — R11.0 NAUSEA: Status: RESOLVED | Noted: 2022-05-23 | Resolved: 2022-06-23

## 2022-06-23 PROBLEM — F32.A ANXIETY AND DEPRESSION: Status: RESOLVED | Noted: 2022-05-16 | Resolved: 2022-06-23

## 2022-06-23 PROBLEM — R55 SYNCOPE: Status: RESOLVED | Noted: 2022-05-23 | Resolved: 2022-06-23

## 2022-06-23 PROCEDURE — 90677 PCV20 VACCINE IM: CPT

## 2022-06-23 PROCEDURE — 90471 IMMUNIZATION ADMIN: CPT

## 2022-06-23 PROCEDURE — 99214 OFFICE O/P EST MOD 30 MIN: CPT

## 2022-06-23 RX ORDER — ZOLPIDEM TARTRATE 5 MG/1
5 TABLET ORAL
Qty: 30 TABLET | Refills: 0 | Status: SHIPPED | OUTPATIENT
Start: 2022-06-23 | End: 2022-07-11 | Stop reason: SDUPTHER

## 2022-06-23 RX ORDER — BUDESONIDE AND FORMOTEROL FUMARATE DIHYDRATE 160; 4.5 UG/1; UG/1
2 AEROSOL RESPIRATORY (INHALATION) 2 TIMES DAILY
Qty: 10.2 G | Refills: 3 | Status: SHIPPED | OUTPATIENT
Start: 2022-06-23

## 2022-06-23 RX ORDER — OXYBUTYNIN CHLORIDE 5 MG/1
5 TABLET, EXTENDED RELEASE ORAL DAILY
Qty: 30 TABLET | Refills: 3 | Status: SHIPPED | OUTPATIENT
Start: 2022-06-23

## 2022-06-23 NOTE — ASSESSMENT & PLAN NOTE
Has cut smoking back to less than a pack a day  Has cloudy thick phlegm  Counseled to continue to try to cut down on smoking  Follow up with PFTs and pulm

## 2022-06-23 NOTE — ASSESSMENT & PLAN NOTE
Great job loosing 7 lbs!!!   Suggest my fitness Pal   Reduce daily calories by 300 calories a day  Increase activity to 150 minutes a week  Suggest lean protein high fiber diet  Eats small portions every 3 hours  5-9 fruits and vegetables a day  Do not drink sugary drinks

## 2022-06-23 NOTE — ASSESSMENT & PLAN NOTE
Palpable right sided mass approximately 1 cm in length  Previous imaging was negative, will continue to follow  Call if mass feels as if it has grown or becomes painful/tender

## 2022-06-23 NOTE — ASSESSMENT & PLAN NOTE
At this point patient would not like to pursue further treatment with orthopedics  Please purchase wrist splints to wear at night

## 2022-06-23 NOTE — ASSESSMENT & PLAN NOTE
Start oxybutynin once daily in the am  Practice complete emptying when you urinate, and Kegel exercises   Strongly recommend pelvic floor PT

## 2022-06-23 NOTE — PROGRESS NOTES
Assessment/Plan:         Problem List Items Addressed This Visit        Other    Bipolar affective disorder, current episode hypomanic ([address] )     Continue Seroquel and Wellbutrin  Will continue to follow  Relevant Medications    zolpidem (AMBIEN) 5 mg tablet    Tobacco use disorder     Has cut smoking back to less than a pack a day  Has cloudy thick phlegm  Counseled to continue to try to cut down on smoking  Follow up with PFTs and pulm  Hyperlipidemia     Great job loosing weight  Continue to eat high fiber foods and avoid high cholesterol foods  Class 1 obesity due to excess calories without serious comorbidity with body mass index (BMI) of 33 0 to 33 9 in adult     [facility] job loosing 7 lbs!!!   Suggest my fitness Pal   Reduce daily calories by 300 calories a day  Increase activity to 150 minutes a week  Suggest lean protein high fiber diet  Eats small portions every 3 hours  5-9 fruits and vegetables a day  Do not drink sugary drinks  Arthralgia of both knees     Have lab work done, will call with results  Relevant Orders    RF Screen w/ Reflex to Titer    Lyme Antibody Profile with reflex to WB    C-reactive protein    KENNY SCR, IFA W/REFL TITER/PATTERN/LUPUS PNL 4    Mass of right side of neck     Palpable right sided mass approximately 1 cm in length  Previous imaging was negative, will continue to follow  Call if mass feels as if it has grown or becomes painful/tender  Short of breath on exertion     Follow up with pfts, pulm and stress test, will call with results  Continue to try to quit smoking  Relevant Medications    budesonide-formoterol (Symbicort) 160-4 5 mcg/act inhaler    Other Relevant Orders    Stress test only, exercise    Lymphocytosis     Have lab work, will call with results  Relevant Orders    CBC and differential    Fatigue     Have lab work, will call with results             Relevant Orders    TSH, 3rd generation with Free T4 reflex    Lyme Antibody Profile with reflex to WB    C-reactive protein    KENNY SCR, IFA W/REFL TITER/PATTERN/LUPUS PNL 4    Positive Tinel's sign     At this point patient would not like to pursue further treatment with orthopedics  Please purchase wrist splints to wear at night  Stress incontinence of urine - Primary     Start oxybutynin once daily in the am  Practice complete emptying when you urinate, and Kegel exercises  Strongly recommend pelvic floor PT  Relevant Medications    oxybutynin (DITROPAN-XL) 5 mg 24 hr tablet    Insomnia     Ambien work well for your mother so we will do a trial with Ambien 5 mg once daily in the evening  Relevant Medications    zolpidem (AMBIEN) 5 mg tablet    Chest pain    Relevant Orders    Stress test only, exercise    Knee swelling     Have lab work will call with results  Relevant Orders    RF Screen w/ Reflex to Titer    Lyme Antibody Profile with reflex to WB    C-reactive protein    KENNY SCR, IFA W/REFL TITER/PATTERN/LUPUS PNL 4      Other Visit Diagnoses     Encounter for physical examination related to employment        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  Have lab work done, will call with results  Relevant Orders    Quantiferon TB Gold Plus    Need for pneumococcal vaccination        Pneumovax 20 vaccine given today  For 40year old female with extensive history of smoking and asthma with exacberation  Relevant Orders    Pneumococcal Conjugate Vaccine 20-valent (Pcv20) (Completed)            Subjective:      Patient ID: Ryan Ramirez is a 40 y o  female  Antoninasouth Velasquez is here today for follow up  She is having an allergic reaction on her neck from fake jewlrey  She woke up with the necklace imbedded in neck and was it painful and now has a rash  Antonina Ron is moving again to help her niece   She had an episode where everything went numb and they resolved after about a minute  The reddness in the eyes keeps coming back in her eyes  She is having bilateral knee swelling and arthralgias  She also has bilateral arm and hand pain, tingling and numbness from shoulders to hands  She has been eating 2 bananas a day and trying to loose weight be eating smaller more frequent meals  She also is having chest pain over her left breast that may be relieved with rest       The following portions of the patient's history were reviewed and updated as appropriate:   Past Medical History:  She has a past medical history of Anxiety, Back pain, and Bipolar disorder (manic depression) ([address] )  ,  _______________________________________________________________________  Medical Problems:  does not have any pertinent problems on file ,  _______________________________________________________________________  Past Surgical History:   has a past surgical history that includes Hysterectomy ();  section; and Gallbladder surgery  ,  _______________________________________________________________________  Family History:  family history includes Alcohol abuse in her brother; Cancer in her maternal grandmother; Coronary artery disease in her brother; Diabetes in her father, mother, and sister; Heart disease in her maternal grandfather, maternal grandmother, paternal grandfather, and paternal grandmother; Lung cancer in her maternal aunt; Ovarian cancer in her paternal grandmother ,  _______________________________________________________________________  Social History:   reports that she has been smoking cigarettes  She has a 27 00 pack-year smoking history  She has never used smokeless tobacco  She reports previous alcohol use  She reports previous drug use ,  _______________________________________________________________________  Allergies:  has No Known Allergies     _______________________________________________________________________  Current Outpatient Medications   Medication Sig Dispense Refill    budesonide-formoterol (Symbicort) 160-4 5 mcg/act inhaler Inhale 2 puffs 2 (two) times a day Rinse mouth after use  10 2 g 3    oxybutynin (DITROPAN-XL) 5 mg 24 hr tablet Take 1 tablet (5 mg total) by mouth daily 30 tablet 3    zolpidem (AMBIEN) 5 mg tablet Take 1 tablet (5 mg total) by mouth daily at bedtime as needed for sleep 30 tablet 0    albuterol (PROVENTIL HFA,VENTOLIN HFA) 90 mcg/act inhaler INHALE 2 PUFFS EVERY 6 HOURS AS NEEDED FOR WHEEZING 18 g 5    atorvastatin (LIPITOR) 20 mg tablet Take 1 tablet (20 mg total) by mouth in the morning  90 tablet 0    buPROPion (WELLBUTRIN XL) 300 mg 24 hr tablet Take 1 tablet (300 mg total) by mouth every morning 30 tablet 3    cloNIDine (CATAPRES) 0 1 mg tablet Take 1 tablet (0 1 mg total) by mouth daily at bedtime 30 tablet 3    gabapentin (NEURONTIN) 800 mg tablet Take 1 tablet (800 mg total) by mouth 3 (three) times a day 90 tablet 0    hydrOXYzine HCL (ATARAX) 25 mg tablet Take 1 tablet (25 mg total) by mouth every 6 (six) hours as needed for itching 90 tablet 3    ibuprofen (MOTRIN) 800 mg tablet Take 1 tablet (800 mg total) by mouth every 6 (six) hours as needed for mild pain or moderate pain 60 tablet 3    loratadine (CLARITIN) 10 mg tablet Take 1 tablet (10 mg total) by mouth in the morning  30 tablet 0    ondansetron (ZOFRAN-ODT) 4 mg disintegrating tablet Take 1 tablet (4 mg total) by mouth every 8 (eight) hours as needed for nausea or vomiting 30 tablet 3    QUEtiapine (SEROquel) 25 mg tablet Take 1 tablet (25 mg total) by mouth daily at bedtime 90 tablet 2    QUEtiapine (SEROquel) 50 mg tablet Take 1 tablet (50 mg total) by mouth daily at bedtime 90 tablet 2    TobraDex ophthalmic ointment Administer into the left eye 3 (three) times a day 3 5 g 3    triamcinolone (KENALOG) 0 1 % ointment apply topically affected area twice a day       No current facility-administered medications for this visit  _______________________________________________________________________  Review of Systems   Constitutional: Negative for chills, diaphoresis and fever  HENT: Negative for congestion, ear pain, sinus pressure, sinus pain and sore throat  Eyes: Negative for pain and visual disturbance  Respiratory: Positive for cough, shortness of breath (cant even walk up steps without a problem) and wheezing  Cardiovascular: Positive for chest pain (left sided chest pain that comes out of nowhere that hurts for a little bit and goes away  Relieved by rest )  Negative for palpitations  Gastrointestinal: Negative for abdominal pain, constipation, diarrhea, nausea and vomiting  Genitourinary: Negative for dysuria, frequency, hematuria and urgency  Musculoskeletal: Positive for arthralgias (cercival neck pain that feels like kinking), back pain (low) and myalgias  Skin: Negative for color change and rash  Neurological: Positive for dizziness and numbness  Negative for seizures and syncope  Psychiatric/Behavioral: Positive for agitation (Gets to 10 sometimes) and sleep disturbance (keeps waking up in the middle of the night  Feels unrested when she wakes in the morning  )  Negative for dysphoric mood  All other systems reviewed and are negative  Objective:  Vitals:    06/23/22 1539   BP: 122/78   Pulse: 76   SpO2: 98%   Weight: 79 4 kg (175 lb)   Height: 5' 1" (1 549 m)     Body mass index is 33 07 kg/m²  Physical Exam  Vitals and nursing note reviewed  Constitutional:       Appearance: Normal appearance  She is not ill-appearing  HENT:      Right Ear: Tympanic membrane, ear canal and external ear normal  There is no impacted cerumen  Left Ear: Tympanic membrane, ear canal and external ear normal  There is no impacted cerumen  Nose: Nose normal  No congestion  Mouth/Throat:      Mouth: Mucous membranes are moist       Pharynx: No posterior oropharyngeal erythema     Eyes: Extraocular Movements: Extraocular movements intact  Cardiovascular:      Rate and Rhythm: Normal rate and regular rhythm  Heart sounds: Normal heart sounds  No murmur heard  Pulmonary:      Effort: Pulmonary effort is normal       Breath sounds: Normal breath sounds  No wheezing  Abdominal:      Palpations: Abdomen is soft  Tenderness: There is no abdominal tenderness  Musculoskeletal:         General: Swelling (bilateral knees, positive tinnels ) and tenderness (bilateral knees) present  Normal range of motion  Cervical back: Normal range of motion  Right lower leg: No edema  Left lower leg: No edema  Skin:     General: Skin is warm and dry  Findings: Rash (around neck in the shape of a necklace ) present  Neurological:      General: No focal deficit present  Mental Status: She is alert     Psychiatric:         Mood and Affect: Mood normal          Behavior: Behavior normal

## 2022-06-24 ENCOUNTER — TELEPHONE (OUTPATIENT)
Dept: FAMILY MEDICINE CLINIC | Facility: CLINIC | Age: 42
End: 2022-06-24

## 2022-06-24 ENCOUNTER — APPOINTMENT (OUTPATIENT)
Dept: LAB | Facility: HOSPITAL | Age: 42
End: 2022-06-24
Payer: COMMERCIAL

## 2022-06-24 DIAGNOSIS — M25.561 ARTHRALGIA OF BOTH KNEES: ICD-10-CM

## 2022-06-24 DIAGNOSIS — D72.820 LYMPHOCYTOSIS: Primary | ICD-10-CM

## 2022-06-24 DIAGNOSIS — M25.562 ARTHRALGIA OF BOTH KNEES: ICD-10-CM

## 2022-06-24 DIAGNOSIS — R53.83 FATIGUE, UNSPECIFIED TYPE: ICD-10-CM

## 2022-06-24 DIAGNOSIS — D72.820 LYMPHOCYTOSIS: ICD-10-CM

## 2022-06-24 DIAGNOSIS — M25.469 KNEE SWELLING: ICD-10-CM

## 2022-06-24 DIAGNOSIS — Z02.89 ENCOUNTER FOR PHYSICAL EXAMINATION RELATED TO EMPLOYMENT: ICD-10-CM

## 2022-06-24 LAB
BASOPHILS # BLD AUTO: 0.02 THOUSANDS/ΜL (ref 0–0.1)
BASOPHILS NFR BLD AUTO: 0 % (ref 0–1)
CRP SERPL QL: 10.3 MG/L
EOSINOPHIL # BLD AUTO: 0.07 THOUSAND/ΜL (ref 0–0.61)
EOSINOPHIL NFR BLD AUTO: 1 % (ref 0–6)
ERYTHROCYTE [DISTWIDTH] IN BLOOD BY AUTOMATED COUNT: 13.3 % (ref 11.6–15.1)
HCT VFR BLD AUTO: 40.1 % (ref 34.8–46.1)
HGB BLD-MCNC: 13.5 G/DL (ref 11.5–15.4)
IMM GRANULOCYTES # BLD AUTO: 0.04 THOUSAND/UL (ref 0–0.2)
IMM GRANULOCYTES NFR BLD AUTO: 0 % (ref 0–2)
LYMPHOCYTES # BLD AUTO: 2.26 THOUSANDS/ΜL (ref 0.6–4.47)
LYMPHOCYTES NFR BLD AUTO: 21 % (ref 14–44)
MCH RBC QN AUTO: 29.9 PG (ref 26.8–34.3)
MCHC RBC AUTO-ENTMCNC: 33.7 G/DL (ref 31.4–37.4)
MCV RBC AUTO: 89 FL (ref 82–98)
MONOCYTES # BLD AUTO: 0.84 THOUSAND/ΜL (ref 0.17–1.22)
MONOCYTES NFR BLD AUTO: 8 % (ref 4–12)
NEUTROPHILS # BLD AUTO: 7.46 THOUSANDS/ΜL (ref 1.85–7.62)
NEUTS SEG NFR BLD AUTO: 70 % (ref 43–75)
NRBC BLD AUTO-RTO: 0 /100 WBCS
PLATELET # BLD AUTO: 230 THOUSANDS/UL (ref 149–390)
PMV BLD AUTO: 11.3 FL (ref 8.9–12.7)
RBC # BLD AUTO: 4.51 MILLION/UL (ref 3.81–5.12)
TSH SERPL DL<=0.05 MIU/L-ACNC: 3.42 UIU/ML (ref 0.45–4.5)
WBC # BLD AUTO: 10.69 THOUSAND/UL (ref 4.31–10.16)

## 2022-06-24 PROCEDURE — 86430 RHEUMATOID FACTOR TEST QUAL: CPT

## 2022-06-24 PROCEDURE — 86480 TB TEST CELL IMMUN MEASURE: CPT

## 2022-06-24 PROCEDURE — 84443 ASSAY THYROID STIM HORMONE: CPT

## 2022-06-24 PROCEDURE — 86618 LYME DISEASE ANTIBODY: CPT

## 2022-06-24 PROCEDURE — 86038 ANTINUCLEAR ANTIBODIES: CPT

## 2022-06-24 PROCEDURE — 85025 COMPLETE CBC W/AUTO DIFF WBC: CPT

## 2022-06-24 PROCEDURE — 36415 COLL VENOUS BLD VENIPUNCTURE: CPT

## 2022-06-24 PROCEDURE — 86140 C-REACTIVE PROTEIN: CPT

## 2022-06-24 NOTE — TELEPHONE ENCOUNTER
Jazmin Molina said she took Ambien last night and she couldn't fall asleep til 2 o'clock this morning she thinks its not strong enough or she built a high tolerance with the other meds

## 2022-06-25 LAB
B BURGDOR IGG+IGM SER-ACNC: 0.3 AI
RHEUMATOID FACT SER QL LA: NEGATIVE

## 2022-06-26 LAB
GAMMA INTERFERON BACKGROUND BLD IA-ACNC: 0.05 IU/ML
M TB IFN-G BLD-IMP: NEGATIVE
M TB IFN-G CD4+ BCKGRND COR BLD-ACNC: 0 IU/ML
M TB IFN-G CD4+ BCKGRND COR BLD-ACNC: 0 IU/ML
MITOGEN IGNF BCKGRD COR BLD-ACNC: >10 IU/ML

## 2022-06-27 LAB — RYE IGE QN: NEGATIVE

## 2022-07-06 ENCOUNTER — TELEPHONE (OUTPATIENT)
Dept: HEMATOLOGY ONCOLOGY | Facility: CLINIC | Age: 42
End: 2022-07-06

## 2022-07-11 ENCOUNTER — TELEPHONE (OUTPATIENT)
Dept: FAMILY MEDICINE CLINIC | Facility: CLINIC | Age: 42
End: 2022-07-11

## 2022-07-11 DIAGNOSIS — F31.0 BIPOLAR AFFECTIVE DISORDER, CURRENT EPISODE HYPOMANIC (HCC): ICD-10-CM

## 2022-07-11 RX ORDER — ZOLPIDEM TARTRATE 10 MG/1
10 TABLET ORAL
Qty: 30 TABLET | Refills: 0 | Status: CANCELLED | OUTPATIENT
Start: 2022-07-11

## 2022-07-11 RX ORDER — GABAPENTIN 800 MG/1
800 TABLET ORAL 3 TIMES DAILY
Qty: 90 TABLET | Refills: 0 | Status: CANCELLED | OUTPATIENT
Start: 2022-07-11 | End: 2022-08-10

## 2022-07-11 NOTE — TELEPHONE ENCOUNTER
She ran out of her zolpidem because you told her to take two 5 mg tablets  She also said that you said there was inflammation in her body  She knows it was her sinuses  She had sinusitis and her face blew up and she couldn't afford the medicine          [facility] site checked    #30 zolpidem 5 mg tabs filled on 6/23

## 2022-07-11 NOTE — TELEPHONE ENCOUNTER
Patient has called back  She has made pharmacy change to [facility] in [place]   Please advise, Thank you

## 2022-07-18 ENCOUNTER — TELEPHONE (OUTPATIENT)
Dept: FAMILY MEDICINE CLINIC | Facility: CLINIC | Age: 42
End: 2022-07-18

## 2022-07-18 NOTE — TELEPHONE ENCOUNTER
Patient called stating you have been trying to reach her for a week  Her phone was shut off  She is requesting that you call her back at [phone] please?

## 2022-07-19 ENCOUNTER — TELEPHONE (OUTPATIENT)
Dept: FAMILY MEDICINE CLINIC | Facility: CLINIC | Age: 42
End: 2022-07-19

## 2022-07-19 NOTE — TELEPHONE ENCOUNTER
Called pt at [phone] and left a voicemail to have her call us back regarding what the phone call was about  I let her know that I haven't been trying to reach her and that you were off on vacation last week

## 2022-09-01 ENCOUNTER — TELEPHONE (OUTPATIENT)
Dept: FAMILY MEDICINE CLINIC | Facility: CLINIC | Age: 42
End: 2022-09-01

## 2022-09-01 NOTE — TELEPHONE ENCOUNTER
Marylen Estelle called saying she think she has UTI it burns when she pees strong smell and dark in color  I offered her a nurse visit today can not make it because she does not have insurance on her car and its not inspected but she then said she can come tomorrow she will put insurance on car  She was really looking for you to call in an antibiotic

## 2022-09-03 DIAGNOSIS — G47.00 INSOMNIA, UNSPECIFIED TYPE: ICD-10-CM

## 2022-09-05 RX ORDER — ZOLPIDEM TARTRATE 10 MG/1
TABLET ORAL
Qty: 30 TABLET | Refills: 0 | Status: SHIPPED | OUTPATIENT
Start: 2022-09-05 | End: 2022-10-06 | Stop reason: SDUPTHER

## 2022-09-07 ENCOUNTER — OFFICE VISIT (OUTPATIENT)
Dept: FAMILY MEDICINE CLINIC | Facility: CLINIC | Age: 42
End: 2022-09-07
Payer: COMMERCIAL

## 2022-09-07 VITALS
SYSTOLIC BLOOD PRESSURE: 120 MMHG | DIASTOLIC BLOOD PRESSURE: 72 MMHG | HEART RATE: 78 BPM | BODY MASS INDEX: 31.15 KG/M2 | HEIGHT: 61 IN | WEIGHT: 165 LBS | OXYGEN SATURATION: 98 %

## 2022-09-07 DIAGNOSIS — R07.9 CHEST PAIN, UNSPECIFIED TYPE: ICD-10-CM

## 2022-09-07 DIAGNOSIS — F31.0 BIPOLAR AFFECTIVE DISORDER, CURRENT EPISODE HYPOMANIC (HCC): ICD-10-CM

## 2022-09-07 DIAGNOSIS — Z59.00 HOMELESSNESS: ICD-10-CM

## 2022-09-07 DIAGNOSIS — R30.0 DYSURIA: Primary | ICD-10-CM

## 2022-09-07 DIAGNOSIS — F17.200 TOBACCO USE DISORDER: ICD-10-CM

## 2022-09-07 DIAGNOSIS — R06.02 SHORT OF BREATH ON EXERTION: ICD-10-CM

## 2022-09-07 DIAGNOSIS — G47.00 INSOMNIA, UNSPECIFIED TYPE: ICD-10-CM

## 2022-09-07 PROBLEM — M94.0 COSTOCHONDRITIS: Status: RESOLVED | Noted: 2022-05-23 | Resolved: 2022-09-07

## 2022-09-07 LAB
SL AMB  POCT GLUCOSE, UA: ABNORMAL
SL AMB LEUKOCYTE ESTERASE,UA: ABNORMAL
SL AMB POCT BILIRUBIN,UA: ABNORMAL
SL AMB POCT BLOOD,UA: ABNORMAL
SL AMB POCT CLARITY,UA: ABNORMAL
SL AMB POCT COLOR,UA: YELLOW
SL AMB POCT KETONES,UA: ABNORMAL
SL AMB POCT NITRITE,UA: ABNORMAL
SL AMB POCT PH,UA: 6
SL AMB POCT SPECIFIC GRAVITY,UA: 1.01
SL AMB POCT URINE PROTEIN: 15
SL AMB POCT UROBILINOGEN: ABNORMAL

## 2022-09-07 PROCEDURE — 87186 SC STD MICRODIL/AGAR DIL: CPT

## 2022-09-07 PROCEDURE — 99214 OFFICE O/P EST MOD 30 MIN: CPT

## 2022-09-07 PROCEDURE — 81002 URINALYSIS NONAUTO W/O SCOPE: CPT

## 2022-09-07 PROCEDURE — 87086 URINE CULTURE/COLONY COUNT: CPT

## 2022-09-07 PROCEDURE — 87077 CULTURE AEROBIC IDENTIFY: CPT

## 2022-09-07 RX ORDER — CIPROFLOXACIN 500 MG/1
500 TABLET, FILM COATED ORAL EVERY 12 HOURS SCHEDULED
Qty: 14 TABLET | Refills: 0 | Status: SHIPPED | OUTPATIENT
Start: 2022-09-07 | End: 2022-09-14

## 2022-09-07 RX ORDER — CITALOPRAM 10 MG/1
10 TABLET ORAL DAILY
Qty: 30 TABLET | Refills: 1 | Status: SHIPPED | OUTPATIENT
Start: 2022-09-07 | End: 2022-09-15

## 2022-09-07 SDOH — ECONOMIC STABILITY - HOUSING INSECURITY: HOMELESSNESS UNSPECIFIED: Z59.00

## 2022-09-07 NOTE — ASSESSMENT & PLAN NOTE
Nicorette ordered as patches don't stay in place and Bernadette Walsh does not like the lozenges  Lung sounds are rhonchi throughout  Strongly recommend quitting smoking

## 2022-09-07 NOTE — ASSESSMENT & PLAN NOTE
Referral placed to complex care management, will continue to follow up  Patient is facing impending loss of her current housing which is contributing to her current episode of major depression

## 2022-09-07 NOTE — ASSESSMENT & PLAN NOTE
Will add celexa, follow up in 1 month  Patient has been faced with a lot of stressors and has been experiencing panic attacks, racing thoughts and has not had the motivation to get out of bed  She has only been getting out of bed to smoke cigarettes  It can take 4-6 weeks for this medication to be fully therapeutic  Call with any affects  I also think that the current episode of major depression has many contributing factors such as multiple stressors at niece's house and impending loss of housing  I did give oJrge L Monk the information for new perspectives and also feet to street  Referral placed to complex care management

## 2022-09-07 NOTE — ASSESSMENT & PLAN NOTE
Please schedule PFTs, previous CT results reviewed  Will follow up in 1 month  Continue albuterol as needed

## 2022-09-07 NOTE — PROGRESS NOTES
Assessment/Plan:         Problem List Items Addressed This Visit        Other    Bipolar affective disorder, current episode hypomanic ([address] )     Will add celexa, follow up in 1 month  Patient has been faced with a lot of stressors and has been experiencing panic attacks, racing thoughts and has not had the motivation to get out of bed  She has only been getting out of bed to smoke cigarettes  It can take 4-6 weeks for this medication to be fully therapeutic  Call with any affects  I also think that the current episode of major depression has many contributing factors such as multiple stressors at niece's house and impending loss of housing  I did give Carl  the information for new perspectives and also feet to street  Referral placed to complex care management  Relevant Medications    citalopram (CeleXA) 10 mg tablet    nicotine polacrilex (NICORETTE) 4 mg gum    Other Relevant Orders    Ambulatory Referral to Complex Care Management Program    Tobacco use disorder     Nicorette ordered as patches don't stay in place and Carl  does not like the lozenges  Lung sounds are rhonchi throughout  Strongly recommend quitting smoking  Relevant Medications    nicotine polacrilex (NICORETTE) 4 mg gum    Short of breath on exertion     Please schedule PFTs, previous CT results reviewed  Will follow up in 1 month  Continue albuterol as needed  Insomnia     Doing much better with Ambien, will continue ambien         Chest pain     Please schedule stress test, will call with results  Homelessness     Referral placed to complex care management, will continue to follow up  Patient is facing impending loss of her current housing which is contributing to her current episode of major depression  Relevant Orders    Ambulatory Referral to Complex Care Management Program      Other Visit Diagnoses     Dysuria    -  Primary    Will treat with Cipro for 5 days      Relevant Medications ciprofloxacin (CIPRO) 500 mg tablet    Other Relevant Orders    POCT urine dip (Completed)    Urine culture            Subjective:      Patient ID: Tiburcio Chapman is a 40 y o  female  Renukarubi Contreras is here, she has been having urinary frequency, burning and urgency  She has been getting sick a lot, her mom kicked her out and she has been living with her niece and now they have bed bugs  She is going to find a new place to live  She has not been able to start working and is still working on employment  The following portions of the patient's history were reviewed and updated as appropriate:   Past Medical History:  She has a past medical history of Anxiety, Back pain, and Bipolar disorder (manic depression) ([address] )  ,  _______________________________________________________________________  Medical Problems:  does not have any pertinent problems on file ,  _______________________________________________________________________  Past Surgical History:   has a past surgical history that includes Hysterectomy ();  section; and Gallbladder surgery  ,  _______________________________________________________________________  Family History:  family history includes Alcohol abuse in her brother; Cancer in her maternal grandmother; Coronary artery disease in her brother; Diabetes in her father, mother, and sister; Heart disease in her maternal grandfather, maternal grandmother, paternal grandfather, and paternal grandmother; Lung cancer in her maternal aunt; Ovarian cancer in her paternal grandmother ,  _______________________________________________________________________  Social History:   reports that she has been smoking cigarettes  She has a 27 00 pack-year smoking history  She has never used smokeless tobacco  She reports previous alcohol use   She reports previous drug use ,  _______________________________________________________________________  Allergies:  has No Known Allergies     _______________________________________________________________________  Current Outpatient Medications   Medication Sig Dispense Refill    ciprofloxacin (CIPRO) 500 mg tablet Take 1 tablet (500 mg total) by mouth every 12 (twelve) hours for 7 days 14 tablet 0    citalopram (CeleXA) 10 mg tablet Take 1 tablet (10 mg total) by mouth daily 30 tablet 1    nicotine polacrilex (NICORETTE) 4 mg gum Chew 1 each (4 mg total) as needed for smoking cessation 100 each 0    albuterol (PROVENTIL HFA,VENTOLIN HFA) 90 mcg/act inhaler INHALE 2 PUFFS EVERY 6 HOURS AS NEEDED FOR WHEEZING 18 g 5    atorvastatin (LIPITOR) 20 mg tablet Take 1 tablet (20 mg total) by mouth in the morning  90 tablet 0    budesonide-formoterol (Symbicort) 160-4 5 mcg/act inhaler Inhale 2 puffs 2 (two) times a day Rinse mouth after use  10 2 g 3    buPROPion (WELLBUTRIN XL) 300 mg 24 hr tablet Take 1 tablet (300 mg total) by mouth every morning 30 tablet 3    cloNIDine (CATAPRES) 0 1 mg tablet Take 1 tablet (0 1 mg total) by mouth daily at bedtime 30 tablet 3    gabapentin (NEURONTIN) 800 mg tablet Take 1 tablet (800 mg total) by mouth 3 (three) times a day 90 tablet 3    hydrOXYzine HCL (ATARAX) 25 mg tablet Take 1 tablet (25 mg total) by mouth every 6 (six) hours as needed for itching 90 tablet 3    ibuprofen (MOTRIN) 800 mg tablet Take 1 tablet (800 mg total) by mouth every 6 (six) hours as needed for mild pain or moderate pain 60 tablet 3    loratadine (CLARITIN) 10 mg tablet Take 1 tablet (10 mg total) by mouth in the morning   30 tablet 0    ondansetron (ZOFRAN-ODT) 4 mg disintegrating tablet Take 1 tablet (4 mg total) by mouth every 8 (eight) hours as needed for nausea or vomiting 30 tablet 3    oxybutynin (DITROPAN-XL) 5 mg 24 hr tablet Take 1 tablet (5 mg total) by mouth daily 30 tablet 3    QUEtiapine (SEROquel) 25 mg tablet Take 1 tablet (25 mg total) by mouth daily at bedtime 90 tablet 2    QUEtiapine (SEROquel) 50 mg tablet Take 1 tablet (50 mg total) by mouth daily at bedtime 90 tablet 2    TobraDex ophthalmic ointment Administer into the left eye 3 (three) times a day 3 5 g 3    triamcinolone (KENALOG) 0 1 % ointment apply topically affected area twice a day      zolpidem (AMBIEN) 10 mg tablet TAKE 1 TABLET BY MOUTH DAILY AT BEDTIME AS NEEDED FOR SLEEP  30 tablet 0     No current facility-administered medications for this visit      _______________________________________________________________________  Review of Systems   Constitutional: Positive for fatigue  Negative for chills, diaphoresis and fever  HENT: Positive for congestion  Negative for ear pain, rhinorrhea, sinus pressure, sinus pain and sore throat  Eyes: Negative for pain and visual disturbance  Respiratory: Positive for cough, chest tightness, shortness of breath and wheezing  Cardiovascular: Negative for chest pain and palpitations  Gastrointestinal: Negative for abdominal pain, constipation, diarrhea, nausea and vomiting  Genitourinary: Positive for dysuria, frequency, hematuria and urgency  Musculoskeletal: Positive for arthralgias and myalgias  Negative for back pain  Skin: Negative for color change and rash  Neurological: Positive for headaches  Negative for dizziness, seizures, syncope and light-headedness  Psychiatric/Behavioral: Positive for dysphoric mood and sleep disturbance (Better with ambien)  The patient is nervous/anxious  All other systems reviewed and are negative  Objective:  Vitals:    09/07/22 1453   BP: 120/72   Pulse: 78   SpO2: 98%   Weight: 74 8 kg (165 lb)   Height: 5' 1" (1 549 m)     Body mass index is 31 18 kg/m²  Physical Exam  Vitals and nursing note reviewed  Constitutional:       Appearance: Normal appearance  She is not ill-appearing  HENT:      Head: Normocephalic  Right Ear: Tympanic membrane, ear canal and external ear normal  There is no impacted cerumen        Left Ear: Tympanic membrane, ear canal and external ear normal  There is no impacted cerumen  Nose: Nose normal  No congestion  Mouth/Throat:      Mouth: Mucous membranes are moist       Pharynx: No posterior oropharyngeal erythema  Eyes:      Extraocular Movements: Extraocular movements intact  Cardiovascular:      Rate and Rhythm: Normal rate and regular rhythm  Heart sounds: Normal heart sounds  No murmur heard  Pulmonary:      Effort: Pulmonary effort is normal       Breath sounds: Rhonchi present  No wheezing  Abdominal:      Palpations: Abdomen is soft  Tenderness: There is abdominal tenderness  Musculoskeletal:         General: Normal range of motion  Cervical back: Normal range of motion  Right lower leg: No edema  Left lower leg: Edema present  Skin:     General: Skin is warm and dry  Neurological:      General: No focal deficit present  Mental Status: She is alert     Psychiatric:         Mood and Affect: Mood normal          Behavior: Behavior normal

## 2022-09-09 ENCOUNTER — PATIENT OUTREACH (OUTPATIENT)
Dept: FAMILY MEDICINE CLINIC | Facility: CLINIC | Age: 42
End: 2022-09-09

## 2022-09-09 DIAGNOSIS — Z78.9 NEED FOR FOLLOW-UP BY SOCIAL WORKER: ICD-10-CM

## 2022-09-09 DIAGNOSIS — Z59.00 HOMELESSNESS: Primary | ICD-10-CM

## 2022-09-09 LAB
BACTERIA UR CULT: ABNORMAL
BACTERIA UR CULT: ABNORMAL

## 2022-09-09 SDOH — ECONOMIC STABILITY - HOUSING INSECURITY: HOMELESSNESS UNSPECIFIED: Z59.00

## 2022-09-09 NOTE — PROGRESS NOTES
Direct PCP referral for complex outpatient care management as identified in [address]  Referral Re: Patient is facing impending loss of her current housing which is contributing to her current episode of major depression  Chart review completed  Past Medical History  Bipolar affective disorder, migraines, urinary stress incontinence and tobacco use  See problem list for complete list of medical diagnosis  Review of PCP OV note from 9/7/22  PCP notes patient with recent frequent illness and her her mom kicked her out  Patient has been living with her niece but they now have bed bugs and she needs to find a place to live  Patient has not been able to start working and is working on employment  Placed to Ambulatory referral to social work care management program   In basket sent to  regarding same

## 2022-09-12 ENCOUNTER — PATIENT OUTREACH (OUTPATIENT)
Dept: FAMILY MEDICINE CLINIC | Facility: CLINIC | Age: 42
End: 2022-09-12

## 2022-09-12 NOTE — PROGRESS NOTES
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 and is awaiting her federal background clearance  She denies having any income or savings at this time  Angelic Chase informed me that she has a vehicle that is in need of repairs  Angelic Chase informed me that she receives food stamps and she reports that she has all of her medications  Angelic Chase acknowledges having a significant mental health history and she does not have a Psychiatrist or a therapist   She reports that she is not interested at this time due to having too much going on  Angelic Chase admits to being depressed but denies having any suicidal ideations or thoughts of self harm  I provided supportive listening and advised Angelic Chase to call 211 again for an upate  I also advised her to register with [employer] [facility] [employer] and I provided her with the telephone number for [facility]  Angelic Chase informed me that she had section 8 in the past and she is not registered with public housing   I encouraged Angelic Chase to apply for public housing and she agrees  Angelic Chase reports having ample food, medications and all basic necessities at this time  Angelic Chase agrees to follow through with the recommendations provided  I will continue to follow and provide on going assistance and support

## 2022-09-15 ENCOUNTER — TELEPHONE (OUTPATIENT)
Dept: FAMILY MEDICINE CLINIC | Facility: CLINIC | Age: 42
End: 2022-09-15

## 2022-09-15 NOTE — TELEPHONE ENCOUNTER
The Citalopram is making her heart race and making her go to bathroom, doesn't want to try any new meds, she would like to stay on her pills she been taking and wants to know if she can take the gabapentin 4 times a day instead of 3 times for her anxiety

## 2022-09-18 DIAGNOSIS — F32.A ANXIETY AND DEPRESSION: ICD-10-CM

## 2022-09-18 DIAGNOSIS — F31.0 BIPOLAR AFFECTIVE DISORDER, CURRENT EPISODE HYPOMANIC (HCC): ICD-10-CM

## 2022-09-18 DIAGNOSIS — F41.9 ANXIETY AND DEPRESSION: ICD-10-CM

## 2022-09-18 RX ORDER — CLONIDINE HYDROCHLORIDE 0.1 MG/1
0.1 TABLET ORAL
Qty: 30 TABLET | Refills: 1 | Status: SHIPPED | OUTPATIENT
Start: 2022-09-18 | End: 2022-10-12 | Stop reason: SDUPTHER

## 2022-09-23 ENCOUNTER — PATIENT OUTREACH (OUTPATIENT)
Dept: FAMILY MEDICINE CLINIC | Facility: CLINIC | Age: 42
End: 2022-09-23

## 2022-09-23 NOTE — PROGRESS NOTES
Discussion today with THEODORA CM regarding patient case and previous outreach conducted by THEODORA  At this time, patient does not have any complex needs for CM to aid with   CM surveillance episode resolved and CM removed from care team

## 2022-09-29 DIAGNOSIS — F32.A ANXIETY AND DEPRESSION: Primary | ICD-10-CM

## 2022-09-29 DIAGNOSIS — F41.9 ANXIETY AND DEPRESSION: Primary | ICD-10-CM

## 2022-09-29 RX ORDER — BUPROPION HYDROCHLORIDE 300 MG/1
TABLET ORAL
Qty: 30 TABLET | Refills: 3 | Status: SHIPPED | OUTPATIENT
Start: 2022-09-29 | End: 2022-10-06 | Stop reason: SDUPTHER

## 2022-10-06 DIAGNOSIS — M94.0 COSTOCHONDRITIS: ICD-10-CM

## 2022-10-06 DIAGNOSIS — F41.9 ANXIETY AND DEPRESSION: ICD-10-CM

## 2022-10-06 DIAGNOSIS — F32.A ANXIETY AND DEPRESSION: ICD-10-CM

## 2022-10-06 DIAGNOSIS — G47.00 INSOMNIA, UNSPECIFIED TYPE: ICD-10-CM

## 2022-10-06 RX ORDER — IBUPROFEN 800 MG/1
800 TABLET ORAL EVERY 6 HOURS PRN
Qty: 60 TABLET | Refills: 3 | Status: SHIPPED | OUTPATIENT
Start: 2022-10-06

## 2022-10-06 RX ORDER — BUPROPION HYDROCHLORIDE 300 MG/1
300 TABLET ORAL EVERY MORNING
Qty: 30 TABLET | Refills: 3 | Status: SHIPPED | OUTPATIENT
Start: 2022-10-06

## 2022-10-06 RX ORDER — ZOLPIDEM TARTRATE 10 MG/1
10 TABLET ORAL
Qty: 30 TABLET | Refills: 0 | Status: SHIPPED | OUTPATIENT
Start: 2022-10-06 | End: 2022-10-12 | Stop reason: SDUPTHER

## 2022-10-06 NOTE — TELEPHONE ENCOUNTER
Please resend the 300 mg of wellbutrin it [facility]  She needs refill on ambien and ibuprofen 800mg

## 2022-10-12 DIAGNOSIS — F31.0 BIPOLAR AFFECTIVE DISORDER, CURRENT EPISODE HYPOMANIC (HCC): ICD-10-CM

## 2022-10-12 DIAGNOSIS — F41.9 ANXIETY AND DEPRESSION: ICD-10-CM

## 2022-10-12 DIAGNOSIS — G47.00 INSOMNIA, UNSPECIFIED TYPE: ICD-10-CM

## 2022-10-12 DIAGNOSIS — F32.A ANXIETY AND DEPRESSION: ICD-10-CM

## 2022-10-12 RX ORDER — CLONIDINE HYDROCHLORIDE 0.1 MG/1
0.1 TABLET ORAL
Qty: 30 TABLET | Refills: 1 | Status: SHIPPED | OUTPATIENT
Start: 2022-10-12

## 2022-10-12 RX ORDER — GABAPENTIN 800 MG/1
800 TABLET ORAL 3 TIMES DAILY
Qty: 90 TABLET | Refills: 3 | Status: SHIPPED | OUTPATIENT
Start: 2022-10-12 | End: 2022-10-13

## 2022-10-12 RX ORDER — QUETIAPINE FUMARATE 50 MG/1
50 TABLET, FILM COATED ORAL
Qty: 90 TABLET | Refills: 2 | Status: SHIPPED | OUTPATIENT
Start: 2022-10-12

## 2022-10-12 RX ORDER — ZOLPIDEM TARTRATE 10 MG/1
10 TABLET ORAL
Qty: 30 TABLET | Refills: 0 | Status: SHIPPED | OUTPATIENT
Start: 2022-10-12

## 2022-10-12 NOTE — TELEPHONE ENCOUNTER
Medication refill  Patient called stating her parents kicked her out and now she is living with a friend in [address]  She will need medications sent to her [address]  She did call her insurance to inform them of the situation and see if they will cover her refills since some may be early  Patient was made aware that insurance may not cover all of her medications

## 2022-10-13 RX ORDER — GABAPENTIN 800 MG/1
800 TABLET ORAL 3 TIMES DAILY
Qty: 90 TABLET | Refills: 3 | Status: SHIPPED | OUTPATIENT
Start: 2022-10-13

## 2022-10-13 RX ORDER — GABAPENTIN 800 MG/1
TABLET ORAL
Qty: 90 TABLET | Refills: 3 | Status: SHIPPED | OUTPATIENT
Start: 2022-10-13 | End: 2022-10-13 | Stop reason: SDUPTHER

## 2022-10-19 ENCOUNTER — TELEPHONE (OUTPATIENT)
Dept: FAMILY MEDICINE CLINIC | Facility: CLINIC | Age: 42
End: 2022-10-19

## 2022-10-31 ENCOUNTER — PATIENT OUTREACH (OUTPATIENT)
Dept: FAMILY MEDICINE CLINIC | Facility: CLINIC | Age: 42
End: 2022-10-31

## 2022-10-31 NOTE — PROGRESS NOTES
Several calls placed to patient to provide follow up communication and support but her phone was busy  I was unable to leave a message but will attempt to reach her again in one week

## 2022-11-03 DIAGNOSIS — G47.00 INSOMNIA, UNSPECIFIED TYPE: ICD-10-CM

## 2022-11-03 RX ORDER — ZOLPIDEM TARTRATE 10 MG/1
10 TABLET ORAL
Qty: 30 TABLET | Refills: 0 | OUTPATIENT
Start: 2022-11-03

## 2022-11-04 ENCOUNTER — TELEPHONE (OUTPATIENT)
Dept: FAMILY MEDICINE CLINIC | Facility: CLINIC | Age: 42
End: 2022-11-04

## 2022-11-04 NOTE — TELEPHONE ENCOUNTER
Aaron Perez was sent to the wrong pharmacy  Should be sent to [facility]  Please resend

## 2022-11-08 ENCOUNTER — OFFICE VISIT (OUTPATIENT)
Dept: FAMILY MEDICINE CLINIC | Facility: CLINIC | Age: 42
End: 2022-11-08

## 2022-11-08 VITALS
HEIGHT: 61 IN | SYSTOLIC BLOOD PRESSURE: 130 MMHG | BODY MASS INDEX: 31.26 KG/M2 | DIASTOLIC BLOOD PRESSURE: 88 MMHG | WEIGHT: 165.6 LBS | TEMPERATURE: 96.8 F | OXYGEN SATURATION: 96 % | HEART RATE: 115 BPM

## 2022-11-08 DIAGNOSIS — F31.0 BIPOLAR AFFECTIVE DISORDER, CURRENT EPISODE HYPOMANIC (HCC): ICD-10-CM

## 2022-11-08 DIAGNOSIS — F41.9 ANXIETY AND DEPRESSION: ICD-10-CM

## 2022-11-08 DIAGNOSIS — Z12.4 PAP SMEAR FOR CERVICAL CANCER SCREENING: Primary | ICD-10-CM

## 2022-11-08 DIAGNOSIS — F32.A ANXIETY AND DEPRESSION: ICD-10-CM

## 2022-11-08 RX ORDER — BUSPIRONE HYDROCHLORIDE 30 MG/1
30 TABLET ORAL 2 TIMES DAILY
Qty: 30 TABLET | Refills: 0 | Status: SHIPPED | OUTPATIENT
Start: 2022-11-08

## 2022-11-08 RX ORDER — AMITRIPTYLINE HYDROCHLORIDE 10 MG/1
10 TABLET, FILM COATED ORAL
Qty: 30 TABLET | Refills: 1 | Status: SHIPPED | OUTPATIENT
Start: 2022-11-08 | End: 2022-11-08

## 2022-11-08 RX ORDER — QUETIAPINE FUMARATE 25 MG/1
25 TABLET, FILM COATED ORAL
Qty: 90 TABLET | Refills: 2 | Status: SHIPPED | OUTPATIENT
Start: 2022-11-08
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
## 2022-11-08 NOTE — PROGRESS NOTES
Assessment/Plan:     Chronic Problems:  Bipolar affective disorder, current episode hypomanic ([facility] [address] )  Strongly recommend finding psychiatrist   Referrals placed for local psychiatry and also partial program   Will not change any medications at this time  However will increase BuSpar to 30 mg twice a day  Follow-up in 1 month  Anxiety and depression  Strongly recommend inpatient or outpatient psychiatry  Visit Diagnosis:  Diagnoses and all orders for this visit:    Pap smear for cervical cancer screening  -     Liquid-based pap, diagnostic  -     Liquid-based pap, screening    Bipolar affective disorder, current episode hypomanic (HCC)  -     QUEtiapine (SEROquel) 25 mg tablet; Take 1 tablet (25 mg total) by mouth daily at bedtime  -     Discontinue: amitriptyline (ELAVIL) 10 mg tablet; Take 1 tablet (10 mg total) by mouth daily at bedtime  -     Ambulatory Referral to Psychiatry; Future  -     Ambulatory Referral to [facility] or Partial Psych Program; Future    Anxiety and depression  -     busPIRone (BUSPAR) 30 MG tablet; Take 1 tablet (30 mg total) by mouth 2 (two) times a day  -     Ambulatory Referral to [facility] or Partial Psych Program; Future        Subjective      Mouna Sanders is a 40 y o  female who presents for annual exam  Periods are post hysterectomy, lasting 0 days  Dysmenorrhea:none  Cyclic symptoms include anxiety  No intermenstrual bleeding, spotting, or discharge  The patient reports that there is not domestic violence in her life  Current contraception: none  History of abnormal Pap smear: yes - genital warts  Family history of uterine or ovarian cancer: yes - grandmother had ovarian cancer    Regular self breast exam: no  History of abnormal mammogram: no  Family history of breast cancer: no  History of abnormal lipids: yes - hysterectomy  Previous gyn surgeries:  yes - now on statin  History of previous sti's:  no  Age at menarche: 9  Age at menopause:  OB/GYN history: T-  P- A-  L-  Vaginal deliveries - 1  C-Sections -1  Last pap - unknown  Last mammo - 2022  Last colonoscopy - none  Stool for ifobt - none    HPI  In 2013 [place] Tobin was diagnosed with genital warts  She has had 3 or 4 abortions  She did have a hysterectomy, not sure if she has a cervix  She also has been struggling with lodging and also anxiety  The following portions of the patient's history were reviewed and updated as appropriate: allergies, current medications, past family history, past medical history, past social history, past surgical history and problem list       Review of Systems  Review of Systems   Constitutional: Negative for chills, diaphoresis, fatigue and fever  HENT: Negative for ear pain, sinus pressure, sinus pain and sore throat  Eyes: Negative for pain and visual disturbance  Respiratory: Positive for chest tightness and shortness of breath  Negative for cough and wheezing  Cardiovascular: Negative for chest pain and palpitations  Gastrointestinal: Positive for abdominal pain (tenderness lower abdomen)  Negative for constipation, diarrhea, nausea and vomiting  Genitourinary: Negative for dysuria, frequency, hematuria and urgency  Musculoskeletal: Negative for arthralgias, back pain and myalgias  Skin: Negative for color change and rash  Neurological: Negative for dizziness, seizures, syncope, light-headedness and headaches  Psychiatric/Behavioral: Positive for dysphoric mood and sleep disturbance (better on ambien)  The patient is nervous/anxious  All other systems reviewed and are negative        /88 (BP Location: Left arm, Patient Position: Sitting, Cuff Size: Standard)   Pulse (!) 115   Temp (!) 96 8 °F (36 °C) (Tympanic)   Ht 5' 1" (1 549 m)   Wt 75 1 kg (165 lb 9 6 oz)   SpO2 96%   BMI 31 29 kg/m²   Social History     Socioeconomic History   • Marital status:      Spouse name: Not on file   • Number of children: Not on file   • Years of education: Not on file   • Highest education level: Not on file   Occupational History   • 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   Tobacco Use   • Smoking status: Current Every Day Smoker     Packs/day: 1 00     Years: 27 00     Pack years: 27 00     Types: Cigarettes   • Smokeless tobacco: Never Used   Vaping Use   • Vaping Use: Every day   • Substances: Nicotine   Substance and Sexual Activity   • Alcohol use: Not Currently   • Drug use: Not Currently   • Sexual activity: Not Currently     Partners: Male     Birth control/protection: Surgical   Other Topics Concern   • Not on file   Social History Narrative   • Not on file     Social Determinants of Health     Financial Resource Strain: Not on file   Food Insecurity: Not on file   Transportation Needs: Not on file   Physical Activity: Not on file   Stress: Not on file   Social Connections: Not on file   Intimate Partner Violence: Not on file   Housing Stability: Not on file     Past Medical History:   Diagnosis Date   • Anxiety    • Back pain    • [facility]      Family History   Problem Relation Age of Onset   • Diabetes Mother    • Diabetes Father    • Diabetes Sister    • Cancer Maternal Grandmother    • Heart disease Maternal Grandmother    • Heart disease Maternal Grandfather    • Ovarian cancer Paternal Grandmother    • Heart disease Paternal Grandmother    • Heart disease Paternal Grandfather    • Coronary artery disease Brother    • Alcohol abuse Brother    • Lung cancer Maternal Aunt      Past Surgical History:   Procedure Laterality Date   •  SECTION     • GALLBLADDER SURGERY     • HYSTERECTOMY         Current Outpatient Medications:   •  albuterol (PROVENTIL HFA,VENTOLIN HFA) 90 mcg/act inhaler, INHALE 2 PUFFS EVERY 6 HOURS AS NEEDED FOR WHEEZING, Disp: 18 g, Rfl: 5  •  atorvastatin (LIPITOR) 20 mg tablet, Take 1 tablet (20 mg total) by mouth in the morning , Disp: 90 tablet, Rfl: 0  •  budesonide-formoterol (Symbicort) 160-4 5 mcg/act inhaler, Inhale 2 puffs 2 (two) times a day Rinse mouth after use , Disp: 10 2 g, Rfl: 3  •  buPROPion (WELLBUTRIN XL) 300 mg 24 hr tablet, Take 1 tablet (300 mg total) by mouth every morning, Disp: 30 tablet, Rfl: 3  •  busPIRone (BUSPAR) 30 MG tablet, Take 1 tablet (30 mg total) by mouth 2 (two) times a day, Disp: 30 tablet, Rfl: 0  •  cloNIDine (CATAPRES) 0 1 mg tablet, Take 1 tablet (0 1 mg total) by mouth daily at bedtime, Disp: 30 tablet, Rfl: 1  •  gabapentin (NEURONTIN) 800 mg tablet, Take 1 tablet (800 mg total) by mouth 3 (three) times a day, Disp: 90 tablet, Rfl: 3  •  hydrOXYzine HCL (ATARAX) 25 mg tablet, Take 1 tablet (25 mg total) by mouth every 6 (six) hours as needed for itching, Disp: 90 tablet, Rfl: 3  •  ibuprofen (MOTRIN) 800 mg tablet, Take 1 tablet (800 mg total) by mouth every 6 (six) hours as needed for mild pain or moderate pain, Disp: 60 tablet, Rfl: 3  •  loratadine (CLARITIN) 10 mg tablet, Take 1 tablet (10 mg total) by mouth in the morning , Disp: 30 tablet, Rfl: 0  •  nicotine polacrilex (NICORETTE) 4 mg gum, Chew 1 each (4 mg total) as needed for smoking cessation, Disp: 100 each, Rfl: 0  •  ondansetron (ZOFRAN-ODT) 4 mg disintegrating tablet, Take 1 tablet (4 mg total) by mouth every 8 (eight) hours as needed for nausea or vomiting, Disp: 30 tablet, Rfl: 3  •  oxybutynin (DITROPAN-XL) 5 mg 24 hr tablet, Take 1 tablet (5 mg total) by mouth daily, Disp: 30 tablet, Rfl: 3  •  QUEtiapine (SEROquel) 25 mg tablet, Take 1 tablet (25 mg total) by mouth daily at bedtime, Disp: 90 tablet, Rfl: 2  •  QUEtiapine (SEROquel) 50 mg tablet, Take 1 tablet (50 mg total) by mouth daily at bedtime, Disp: 90 tablet, Rfl: 2  •  TobraDex ophthalmic ointment, Administer into the left eye 3 (three) times a day, Disp: 3 5 g, Rfl: 3  •  triamcinolone (KENALOG) 0 1 % ointment, apply topically affected area twice a day, Disp: , Rfl:   •  zolpidem (AMBIEN) 10 mg tablet, Take 1 tablet (10 mg total) by mouth daily at bedtime as needed for sleep, Disp: 30 tablet, Rfl: 0      Results for orders placed or performed in visit on 09/07/22   Urine culture    Specimen: Urine   Result Value Ref Range    Urine Culture 40,000-49,000 cfu/ml Escherichia coli (A)     Urine Culture 10,000-19,000 cfu/ml         Susceptibility    Escherichia coli - AMANDA     ZID Performed Yes       Ampicillin ($$) <=8 00 Susceptible ug/ml     Aztreonam ($$$)  <=4 Susceptible ug/ml     Cefazolin ($) <=2 00 Susceptible ug/ml     Ciprofloxacin ($)  <=0 25 Susceptible ug/ml     Gentamicin ($$) <=2 Susceptible ug/ml     Levofloxacin ($) <=0 50 Susceptible ug/ml     Nitrofurantoin <=32 Susceptible ug/ml     Tetracycline <=4 Susceptible ug/ml     Tobramycin ($) <=2 Susceptible ug/ml     Trimethoprim + Sulfamethoxazole ($$$) <=0 5/9 5 Susceptible ug/ml   POCT urine dip   Result Value Ref Range    LEUKOCYTE ESTERASE,UA +     NITRITE,UA -     SL AMB POCT UROBILINOGEN -     POCT URINE PROTEIN 15      PH,UA 6 0     BLOOD,UA 5-10     SPECIFIC GRAVITY,UA 1 015     KETONES,UA -     BILIRUBIN,UA -     GLUCOSE, UA -      COLOR,UA yellow     CLARITY,UA cloudy        Objective     Lab Review   Office Visit on 09/07/2022   Component Date Value   • LEUKOCYTE ESTERASE,UA 09/07/2022 +    • NITRITE,UA 09/07/2022 -    • SL AMB POCT UROBILINOGEN 09/07/2022 -    • POCT URINE PROTEIN 09/07/2022 15    •  PH,UA 09/07/2022 6 0    • BLOOD,UA 09/07/2022 5-10    • SPECIFIC GRAVITY,UA 09/07/2022 1 015    • KETONES,UA 09/07/2022 -    • BILIRUBIN,UA 09/07/2022 -    • GLUCOSE, UA 09/07/2022 -    •  COLOR,UA 09/07/2022 yellow    • CLARITY,UA 09/07/2022 cloudy    • Urine Culture 09/07/2022 40,000-49,000 cfu/ml Escherichia coli (A)   • Urine Culture 09/07/2022 10,000-19,000 cfu/ml     Appointment on 06/24/2022   Component Date Value   • TSH 3RD GENERATON 06/24/2022 3 416    • QFT Nil 06/24/2022 0 05    • QFT TB1-NIL 06/24/2022 0 00    • QFT TB2-NIL 06/24/2022 0 00    • QFT Mitogen-NIL 06/24/2022 >10 00    • QFT Final Interpretation 06/24/2022 Negative    • WBC 06/24/2022 10 69 (A)   • RBC 06/24/2022 4 51    • Hemoglobin 06/24/2022 13 5    • Hematocrit 06/24/2022 40 1    • MCV 06/24/2022 89    • MCH 06/24/2022 29 9    • MCHC 06/24/2022 33 7    • RDW 06/24/2022 13 3    • MPV 06/24/2022 11 3    • Platelets 84/67/6448 230    • nRBC 06/24/2022 0    • Neutrophils Relative 06/24/2022 70    • Immat GRANS % 06/24/2022 0    • Lymphocytes Relative 06/24/2022 21    • Monocytes Relative 06/24/2022 8    • Eosinophils Relative 06/24/2022 1    • Basophils Relative 06/24/2022 0    • Neutrophils Absolute 06/24/2022 7 46    • Immature Grans Absolute 06/24/2022 0 04    • Lymphocytes Absolute 06/24/2022 2 26    • Monocytes Absolute 06/24/2022 0 84    • Eosinophils Absolute 06/24/2022 0 07    • Basophils Absolute 06/24/2022 0 02    • Rheumatoid Factor 06/24/2022 Negative    • Lyme Total Antibodies 06/24/2022 0 3    • CRP 06/24/2022 10 3 (A)   • KENNY 06/24/2022 Negative    Appointment on 06/14/2022   Component Date Value   • Sodium 06/14/2022 143    • Potassium 06/14/2022 3 8    • Chloride 06/14/2022 108    • CO2 06/14/2022 26    • ANION GAP 06/14/2022 9    • BUN 06/14/2022 8    • Creatinine 06/14/2022 0 72    • Glucose, Fasting 06/14/2022 71    • Calcium 06/14/2022 8 7    • Corrected Calcium 06/14/2022 9 3    • AST 06/14/2022 16    • ALT 06/14/2022 18    • Alkaline Phosphatase 06/14/2022 88    • Total Protein 06/14/2022 7 0    • Albumin 06/14/2022 3 3 (A)   • Total Bilirubin 06/14/2022 0 21    • eGFR 06/14/2022 104    • WBC 06/14/2022 10 26 (A)   • RBC 06/14/2022 4 97    • Hemoglobin 06/14/2022 14 7    • Hematocrit 06/14/2022 43 6    • MCV 06/14/2022 88    • MCH 06/14/2022 29 6    • MCHC 06/14/2022 33 7    • RDW 06/14/2022 13 2    • MPV 06/14/2022 10 1    • Platelets 06/17/7958 246    • nRBC 06/14/2022 0    • Neutrophils Relative 06/14/2022 59    • Immat GRANS % 06/14/2022 0    • Lymphocytes Relative 06/14/2022 33    • Monocytes Relative 06/14/2022 7    • Eosinophils Relative 06/14/2022 1    • Basophils Relative 06/14/2022 0    • Neutrophils Absolute 06/14/2022 6 11    • Immature Grans Absolute 06/14/2022 0 02    • Lymphocytes Absolute 06/14/2022 3 33    • Monocytes Absolute 06/14/2022 0 68    • Eosinophils Absolute 06/14/2022 0 11    • Basophils Absolute 06/14/2022 0 01    Appointment on 05/31/2022   Component Date Value   • WBC 05/31/2022 13 33 (A)   • RBC 05/31/2022 4 62    • Hemoglobin 05/31/2022 13 8    • Hematocrit 05/31/2022 41 1    • MCV 05/31/2022 89    • MCH 05/31/2022 29 9    • MCHC 05/31/2022 33 6    • RDW 05/31/2022 14 7    • MPV 05/31/2022 10 2    • Platelets 33/04/3721 273    • nRBC 05/31/2022 0    • Segmented Neutrophils Ma* 05/31/2022 37 (A)   • Lymphocytes Manual 05/31/2022 59 (A)   • Monocytes Manual 05/31/2022 3 (A)   • Eosinophils Manual 05/31/2022 1    • Total Counted 05/31/2022 100    • Sodium 05/31/2022 141    • Potassium 05/31/2022 2 9 (A)   • Chloride 05/31/2022 107    • CO2 05/31/2022 21    • ANION GAP 05/31/2022 13    • BUN 05/31/2022 12    • Creatinine 05/31/2022 0 82    • Glucose 05/31/2022 102    • Calcium 05/31/2022 8 8    • Corrected Calcium 05/31/2022 9 3    • AST 05/31/2022 10    • ALT 05/31/2022 15    • Alkaline Phosphatase 05/31/2022 94    • Total Protein 05/31/2022 7 1    • Albumin 05/31/2022 3 4 (A)   • Total Bilirubin 05/31/2022 0 36    • eGFR 05/31/2022 89    • Free T4 05/31/2022 0 76    • T3, Free 05/31/2022 2 51    • HIV-1/HIV-2 Ab 05/31/2022 Non-Reactive    • Hepatitis C Ab 05/31/2022 Non-reactive    Appointment on 05/19/2022   Component Date Value   • WBC 05/19/2022 10 43 (A)   • RBC 05/19/2022 4 50    • Hemoglobin 05/19/2022 13 3    • Hematocrit 05/19/2022 41 2    • MCV 05/19/2022 92    • MCH 05/19/2022 29 6    • MCHC 05/19/2022 32 3    • RDW 05/19/2022 14 8    • MPV 05/19/2022 11 2    • Platelets 10/19/9189 241    • nRBC 05/19/2022 0    • Neutrophils Relative 05/19/2022 38 (A)   • Immat GRANS % 05/19/2022 0    • Lymphocytes Relative 05/19/2022 51 (A)   • Monocytes Relative 05/19/2022 9    • Eosinophils Relative 05/19/2022 2    • Basophils Relative 05/19/2022 0    • Neutrophils Absolute 05/19/2022 3 91    • Immature Grans Absolute 05/19/2022 0 02    • Lymphocytes Absolute 05/19/2022 5 38 (A)   • Monocytes Absolute 05/19/2022 0 94    • Eosinophils Absolute 05/19/2022 0 16    • Basophils Absolute 05/19/2022 0 02    • Sodium 05/19/2022 142    • Potassium 05/19/2022 3 7    • Chloride 05/19/2022 107    • CO2 05/19/2022 26    • ANION GAP 05/19/2022 9    • BUN 05/19/2022 14    • Creatinine 05/19/2022 0 63    • Glucose, Fasting 05/19/2022 84    • Calcium 05/19/2022 8 1 (A)   • Corrected Calcium 05/19/2022 8 7    • AST 05/19/2022 11    • ALT 05/19/2022 16    • Alkaline Phosphatase 05/19/2022 110    • Total Protein 05/19/2022 6 6    • Albumin 05/19/2022 3 2 (A)   • Total Bilirubin 05/19/2022 0 22    • eGFR 05/19/2022 111    • Cholesterol 05/19/2022 199    • Triglycerides 05/19/2022 127    • HDL, Direct 05/19/2022 66    • LDL Calculated 05/19/2022 108 (A)   • Non-HDL-Chol (CHOL-HDL) 05/19/2022 133           Imaging: No results found  Physical Exam  Vitals and nursing note reviewed  Constitutional:       Appearance: Normal appearance  She is not ill-appearing  HENT:      Head: Normocephalic  Right Ear: Tympanic membrane, ear canal and external ear normal  There is no impacted cerumen  Left Ear: Tympanic membrane, ear canal and external ear normal  There is no impacted cerumen  Nose: Nose normal  No congestion  Mouth/Throat:      Mouth: Mucous membranes are moist       Pharynx: No posterior oropharyngeal erythema  Eyes:      Extraocular Movements: Extraocular movements intact  Cardiovascular:      Rate and Rhythm: Normal rate and regular rhythm  Heart sounds: Normal heart sounds  No murmur heard    Pulmonary:      Effort: Pulmonary effort is normal       Breath sounds: Normal breath sounds  No wheezing  Abdominal:      Palpations: Abdomen is soft  Tenderness: There is no abdominal tenderness  Genitourinary:     Exam position: Lithotomy position  Sonny stage (genital): 5  Labia:         Right: No rash, tenderness, lesion or injury  Left: No rash, tenderness, lesion or injury  Urethra: No prolapse, urethral pain, urethral swelling or urethral lesion  Musculoskeletal:         General: Normal range of motion  Cervical back: Normal range of motion  Right lower leg: No edema  Left lower leg: No edema  Lymphadenopathy:      Lower Body: No right inguinal adenopathy  No left inguinal adenopathy  Skin:     General: Skin is warm and dry  Neurological:      General: No focal deficit present  Mental Status: She is alert  Psychiatric:         Mood and Affect: Mood normal          Behavior: Behavior normal            Portions of the record may have been created with voice recognition software   Occasional wrong word or "sound a like" substitutions may have occurred due to the inherent limitations of voice recognition software   Read the chart carefully and recognize, using context, where substitutions have occurred

## 2022-11-08 NOTE — ASSESSMENT & PLAN NOTE
Strongly recommend finding psychiatrist   Referrals placed for local psychiatry and also partial program   Will not change any medications at this time  However will increase BuSpar to 30 mg twice a day  Follow-up in 1 month

## 2022-11-11 ENCOUNTER — TELEPHONE (OUTPATIENT)
Dept: PSYCHOLOGY | Facility: CLINIC | Age: 42
End: 2022-11-11

## 2022-11-11 NOTE — TELEPHONE ENCOUNTER
Tried to call the pt couple of times to discuss PHP but the call was not connecting at all  Can try later      Gillermina Friendly

## 2022-11-13 LAB
LAB AP GYN PRIMARY INTERPRETATION: NORMAL
Lab: NORMAL

## 2022-11-14 ENCOUNTER — TELEPHONE (OUTPATIENT)
Dept: PSYCHOLOGY | Facility: CLINIC | Age: 42
End: 2022-11-14

## 2022-11-14 NOTE — TELEPHONE ENCOUNTER
Today I tried again and was able to leave a message asking pt to return my call to discuss PHP referral     Ami Eligio

## 2022-11-15 ENCOUNTER — TELEPHONE (OUTPATIENT)
Dept: FAMILY MEDICINE CLINIC | Facility: CLINIC | Age: 42
End: 2022-11-15

## 2022-12-02 DIAGNOSIS — G47.00 INSOMNIA, UNSPECIFIED TYPE: ICD-10-CM

## 2022-12-02 DIAGNOSIS — F41.9 ANXIETY AND DEPRESSION: ICD-10-CM

## 2022-12-02 DIAGNOSIS — F31.0 BIPOLAR AFFECTIVE DISORDER, CURRENT EPISODE HYPOMANIC (HCC): ICD-10-CM

## 2022-12-02 DIAGNOSIS — F32.A ANXIETY AND DEPRESSION: ICD-10-CM

## 2022-12-02 RX ORDER — ZOLPIDEM TARTRATE 10 MG/1
10 TABLET ORAL
Qty: 30 TABLET | Refills: 0 | Status: SHIPPED | OUTPATIENT
Start: 2022-12-02

## 2022-12-02 RX ORDER — CLONIDINE HYDROCHLORIDE 0.1 MG/1
0.1 TABLET ORAL
Qty: 30 TABLET | Refills: 1 | Status: SHIPPED | OUTPATIENT
Start: 2022-12-02

## 2022-12-02 NOTE — TELEPHONE ENCOUNTER
Patient called stating she needs refills  She also wants you to know the Buspar is not working at all

## 2022-12-06 ENCOUNTER — PATIENT OUTREACH (OUTPATIENT)
Dept: FAMILY MEDICINE CLINIC | Facility: CLINIC | Age: 42
End: 2022-12-06

## 2022-12-06 NOTE — PROGRESS NOTES
Telephone call placed to Freda Tidwell to provide follow up communication and assistance  She was not available at the time of my call and I left a message on her VM requesting that she return my call

## 2022-12-09 ENCOUNTER — TELEPHONE (OUTPATIENT)
Dept: FAMILY MEDICINE CLINIC | Facility: CLINIC | Age: 42
End: 2022-12-09

## 2022-12-30 DIAGNOSIS — F32.A ANXIETY AND DEPRESSION: ICD-10-CM

## 2022-12-30 DIAGNOSIS — F41.9 ANXIETY AND DEPRESSION: ICD-10-CM

## 2022-12-30 DIAGNOSIS — M94.0 COSTOCHONDRITIS: ICD-10-CM

## 2022-12-30 DIAGNOSIS — G47.00 INSOMNIA, UNSPECIFIED TYPE: ICD-10-CM

## 2022-12-30 DIAGNOSIS — F31.0 BIPOLAR AFFECTIVE DISORDER, CURRENT EPISODE HYPOMANIC (HCC): ICD-10-CM

## 2022-12-30 RX ORDER — IBUPROFEN 800 MG/1
800 TABLET ORAL EVERY 6 HOURS PRN
Qty: 60 TABLET | Refills: 3 | Status: SHIPPED | OUTPATIENT
Start: 2022-12-30

## 2022-12-30 RX ORDER — ZOLPIDEM TARTRATE 10 MG/1
10 TABLET ORAL
Qty: 30 TABLET | Refills: 0 | Status: SHIPPED | OUTPATIENT
Start: 2022-12-30 | End: 2023-01-03

## 2022-12-30 RX ORDER — CLONIDINE HYDROCHLORIDE 0.1 MG/1
0.1 TABLET ORAL
Qty: 30 TABLET | Refills: 1 | Status: SHIPPED | OUTPATIENT
Start: 2022-12-30

## 2022-12-30 RX ORDER — QUETIAPINE FUMARATE 50 MG/1
50 TABLET, FILM COATED ORAL
Qty: 90 TABLET | Refills: 2 | Status: SHIPPED | OUTPATIENT
Start: 2022-12-30

## 2022-12-30 RX ORDER — QUETIAPINE FUMARATE 25 MG/1
25 TABLET, FILM COATED ORAL
Qty: 90 TABLET | Refills: 2 | Status: SHIPPED | OUTPATIENT
Start: 2022-12-30